# Patient Record
Sex: MALE | Race: WHITE | Employment: FULL TIME | URBAN - METROPOLITAN AREA
[De-identification: names, ages, dates, MRNs, and addresses within clinical notes are randomized per-mention and may not be internally consistent; named-entity substitution may affect disease eponyms.]

---

## 2017-02-21 PROBLEM — S83.232A COMPLEX TEAR OF MEDIAL MENISCUS OF LEFT KNEE AS CURRENT INJURY: Status: ACTIVE | Noted: 2017-02-21

## 2017-02-21 PROBLEM — M17.12 PRIMARY OSTEOARTHRITIS OF LEFT KNEE: Status: ACTIVE | Noted: 2017-02-21

## 2017-02-21 PROBLEM — S83.232A COMPLEX TEAR OF MEDIAL MENISCUS OF LEFT KNEE: Status: ACTIVE | Noted: 2017-02-21

## 2017-03-05 ENCOUNTER — LAB ENCOUNTER (OUTPATIENT)
Dept: LAB | Facility: HOSPITAL | Age: 57
End: 2017-03-05
Attending: ORTHOPAEDIC SURGERY
Payer: COMMERCIAL

## 2017-03-05 DIAGNOSIS — Z01.818 PRE-OP TESTING: ICD-10-CM

## 2017-03-05 LAB
ALBUMIN SERPL BCP-MCNC: 3.5 G/DL (ref 3.5–4.8)
ALBUMIN/GLOB SERPL: 1.3 {RATIO} (ref 1–2)
ALP SERPL-CCNC: 49 U/L (ref 32–100)
ALT SERPL-CCNC: 18 U/L (ref 17–63)
ANION GAP SERPL CALC-SCNC: 7 MMOL/L (ref 0–18)
AST SERPL-CCNC: 19 U/L (ref 15–41)
BASOPHILS # BLD: 0 K/UL (ref 0–0.2)
BASOPHILS NFR BLD: 1 %
BILIRUB SERPL-MCNC: 0.6 MG/DL (ref 0.3–1.2)
BUN SERPL-MCNC: 13 MG/DL (ref 8–20)
BUN/CREAT SERPL: 12.4 (ref 10–20)
CALCIUM SERPL-MCNC: 9.1 MG/DL (ref 8.5–10.5)
CHLORIDE SERPL-SCNC: 108 MMOL/L (ref 95–110)
CO2 SERPL-SCNC: 26 MMOL/L (ref 22–32)
CREAT SERPL-MCNC: 1.05 MG/DL (ref 0.5–1.5)
EOSINOPHIL # BLD: 0.3 K/UL (ref 0–0.7)
EOSINOPHIL NFR BLD: 5 %
ERYTHROCYTE [DISTWIDTH] IN BLOOD BY AUTOMATED COUNT: 13.9 % (ref 11–15)
GLOBULIN PLAS-MCNC: 2.8 G/DL (ref 2.5–3.7)
GLUCOSE SERPL-MCNC: 107 MG/DL (ref 70–99)
HCT VFR BLD AUTO: 43.2 % (ref 41–52)
HGB BLD-MCNC: 14.2 G/DL (ref 13.5–17.5)
LYMPHOCYTES # BLD: 1.5 K/UL (ref 1–4)
LYMPHOCYTES NFR BLD: 30 %
MCH RBC QN AUTO: 27.2 PG (ref 27–32)
MCHC RBC AUTO-ENTMCNC: 33 G/DL (ref 32–37)
MCV RBC AUTO: 82.4 FL (ref 80–100)
MONOCYTES # BLD: 0.5 K/UL (ref 0–1)
MONOCYTES NFR BLD: 10 %
NEUTROPHILS # BLD AUTO: 2.8 K/UL (ref 1.8–7.7)
NEUTROPHILS NFR BLD: 55 %
OSMOLALITY UR CALC.SUM OF ELEC: 293 MOSM/KG (ref 275–295)
PLATELET # BLD AUTO: 202 K/UL (ref 140–400)
PMV BLD AUTO: 8.1 FL (ref 7.4–10.3)
POTASSIUM SERPL-SCNC: 4.1 MMOL/L (ref 3.3–5.1)
PROT SERPL-MCNC: 6.3 G/DL (ref 5.9–8.4)
RBC # BLD AUTO: 5.24 M/UL (ref 4.5–5.9)
SODIUM SERPL-SCNC: 141 MMOL/L (ref 136–144)
WBC # BLD AUTO: 5.1 K/UL (ref 4–11)

## 2017-03-05 PROCEDURE — 36415 COLL VENOUS BLD VENIPUNCTURE: CPT

## 2017-03-05 PROCEDURE — 81003 URINALYSIS AUTO W/O SCOPE: CPT | Performed by: ORTHOPAEDIC SURGERY

## 2017-03-05 PROCEDURE — 85025 COMPLETE CBC W/AUTO DIFF WBC: CPT

## 2017-03-05 PROCEDURE — 80053 COMPREHEN METABOLIC PANEL: CPT

## 2017-03-06 RX ORDER — MV-MINS/FOLIC/LYCOPENE/GINKGO 400-300MCG
1 TABLET ORAL
COMMUNITY

## 2017-03-10 ENCOUNTER — ANESTHESIA EVENT (OUTPATIENT)
Dept: SURGERY | Facility: HOSPITAL | Age: 57
End: 2017-03-10
Payer: COMMERCIAL

## 2017-03-10 ENCOUNTER — SURGERY (OUTPATIENT)
Age: 57
End: 2017-03-10

## 2017-03-10 ENCOUNTER — ANESTHESIA (OUTPATIENT)
Dept: SURGERY | Facility: HOSPITAL | Age: 57
End: 2017-03-10
Payer: COMMERCIAL

## 2017-03-10 ENCOUNTER — HOSPITAL ENCOUNTER (OUTPATIENT)
Facility: HOSPITAL | Age: 57
Setting detail: HOSPITAL OUTPATIENT SURGERY
Discharge: HOME | End: 2017-03-10
Attending: ORTHOPAEDIC SURGERY | Admitting: ORTHOPAEDIC SURGERY
Payer: COMMERCIAL

## 2017-03-10 VITALS
DIASTOLIC BLOOD PRESSURE: 88 MMHG | HEART RATE: 54 BPM | TEMPERATURE: 97 F | RESPIRATION RATE: 16 BRPM | HEIGHT: 66 IN | OXYGEN SATURATION: 97 % | SYSTOLIC BLOOD PRESSURE: 138 MMHG | BODY MASS INDEX: 30.53 KG/M2 | WEIGHT: 190 LBS

## 2017-03-10 PROCEDURE — 94010 BREATHING CAPACITY TEST: CPT | Performed by: ORTHOPAEDIC SURGERY

## 2017-03-10 PROCEDURE — 0SBD4ZZ EXCISION OF LEFT KNEE JOINT, PERCUTANEOUS ENDOSCOPIC APPROACH: ICD-10-PCS | Performed by: ORTHOPAEDIC SURGERY

## 2017-03-10 PROCEDURE — 88304 TISSUE EXAM BY PATHOLOGIST: CPT | Performed by: ORTHOPAEDIC SURGERY

## 2017-03-10 RX ORDER — HYDROMORPHONE HYDROCHLORIDE 1 MG/ML
0.4 INJECTION, SOLUTION INTRAMUSCULAR; INTRAVENOUS; SUBCUTANEOUS EVERY 5 MIN PRN
Status: DISCONTINUED | OUTPATIENT
Start: 2017-03-10 | End: 2017-03-10

## 2017-03-10 RX ORDER — ONDANSETRON 2 MG/ML
4 INJECTION INTRAMUSCULAR; INTRAVENOUS EVERY 6 HOURS PRN
Status: DISCONTINUED | OUTPATIENT
Start: 2017-03-10 | End: 2017-03-10

## 2017-03-10 RX ORDER — ONDANSETRON 2 MG/ML
INJECTION INTRAMUSCULAR; INTRAVENOUS AS NEEDED
Status: DISCONTINUED | OUTPATIENT
Start: 2017-03-10 | End: 2017-03-10 | Stop reason: SURG

## 2017-03-10 RX ORDER — MORPHINE SULFATE 10 MG/ML
6 INJECTION, SOLUTION INTRAMUSCULAR; INTRAVENOUS EVERY 10 MIN PRN
Status: DISCONTINUED | OUTPATIENT
Start: 2017-03-10 | End: 2017-03-10

## 2017-03-10 RX ORDER — ACETAMINOPHEN 325 MG/1
650 TABLET ORAL ONCE
Status: COMPLETED | OUTPATIENT
Start: 2017-03-10 | End: 2017-03-10

## 2017-03-10 RX ORDER — HYDROMORPHONE HYDROCHLORIDE 1 MG/ML
0.6 INJECTION, SOLUTION INTRAMUSCULAR; INTRAVENOUS; SUBCUTANEOUS EVERY 5 MIN PRN
Status: DISCONTINUED | OUTPATIENT
Start: 2017-03-10 | End: 2017-03-10

## 2017-03-10 RX ORDER — HYDROCODONE BITARTRATE AND ACETAMINOPHEN 5; 325 MG/1; MG/1
1 TABLET ORAL AS NEEDED
Status: DISCONTINUED | OUTPATIENT
Start: 2017-03-10 | End: 2017-03-10

## 2017-03-10 RX ORDER — NALOXONE HYDROCHLORIDE 0.4 MG/ML
80 INJECTION, SOLUTION INTRAMUSCULAR; INTRAVENOUS; SUBCUTANEOUS AS NEEDED
Status: DISCONTINUED | OUTPATIENT
Start: 2017-03-10 | End: 2017-03-10

## 2017-03-10 RX ORDER — HYDROCODONE BITARTRATE AND ACETAMINOPHEN 5; 325 MG/1; MG/1
1 TABLET ORAL EVERY 4 HOURS PRN
Status: DISCONTINUED | OUTPATIENT
Start: 2017-03-10 | End: 2017-03-10

## 2017-03-10 RX ORDER — SODIUM CHLORIDE, SODIUM LACTATE, POTASSIUM CHLORIDE, CALCIUM CHLORIDE 600; 310; 30; 20 MG/100ML; MG/100ML; MG/100ML; MG/100ML
INJECTION, SOLUTION INTRAVENOUS CONTINUOUS
Status: DISCONTINUED | OUTPATIENT
Start: 2017-03-10 | End: 2017-03-10

## 2017-03-10 RX ORDER — ONDANSETRON 2 MG/ML
4 INJECTION INTRAMUSCULAR; INTRAVENOUS ONCE AS NEEDED
Status: DISCONTINUED | OUTPATIENT
Start: 2017-03-10 | End: 2017-03-10

## 2017-03-10 RX ORDER — DEXAMETHASONE SODIUM PHOSPHATE 4 MG/ML
VIAL (ML) INJECTION AS NEEDED
Status: DISCONTINUED | OUTPATIENT
Start: 2017-03-10 | End: 2017-03-10 | Stop reason: SURG

## 2017-03-10 RX ORDER — MORPHINE SULFATE 4 MG/ML
4 INJECTION, SOLUTION INTRAMUSCULAR; INTRAVENOUS EVERY 10 MIN PRN
Status: DISCONTINUED | OUTPATIENT
Start: 2017-03-10 | End: 2017-03-10

## 2017-03-10 RX ORDER — HYDROCODONE BITARTRATE AND ACETAMINOPHEN 5; 325 MG/1; MG/1
2 TABLET ORAL AS NEEDED
Status: DISCONTINUED | OUTPATIENT
Start: 2017-03-10 | End: 2017-03-10

## 2017-03-10 RX ORDER — FAMOTIDINE 20 MG/1
20 TABLET ORAL ONCE
Status: COMPLETED | OUTPATIENT
Start: 2017-03-10 | End: 2017-03-10

## 2017-03-10 RX ORDER — LABETALOL HYDROCHLORIDE 5 MG/ML
INJECTION, SOLUTION INTRAVENOUS AS NEEDED
Status: DISCONTINUED | OUTPATIENT
Start: 2017-03-10 | End: 2017-03-10 | Stop reason: SURG

## 2017-03-10 RX ORDER — BUPIVACAINE HYDROCHLORIDE AND EPINEPHRINE 2.5; 5 MG/ML; UG/ML
INJECTION, SOLUTION INFILTRATION; PERINEURAL AS NEEDED
Status: DISCONTINUED | OUTPATIENT
Start: 2017-03-10 | End: 2017-03-10 | Stop reason: HOSPADM

## 2017-03-10 RX ORDER — HALOPERIDOL 5 MG/ML
0.25 INJECTION INTRAMUSCULAR ONCE AS NEEDED
Status: DISCONTINUED | OUTPATIENT
Start: 2017-03-10 | End: 2017-03-10

## 2017-03-10 RX ORDER — ACETAMINOPHEN 325 MG/1
650 TABLET ORAL EVERY 4 HOURS PRN
Status: DISCONTINUED | OUTPATIENT
Start: 2017-03-10 | End: 2017-03-10

## 2017-03-10 RX ORDER — HYDROMORPHONE HYDROCHLORIDE 1 MG/ML
0.2 INJECTION, SOLUTION INTRAMUSCULAR; INTRAVENOUS; SUBCUTANEOUS EVERY 5 MIN PRN
Status: DISCONTINUED | OUTPATIENT
Start: 2017-03-10 | End: 2017-03-10

## 2017-03-10 RX ORDER — LIDOCAINE HYDROCHLORIDE 10 MG/ML
INJECTION, SOLUTION EPIDURAL; INFILTRATION; INTRACAUDAL; PERINEURAL AS NEEDED
Status: DISCONTINUED | OUTPATIENT
Start: 2017-03-10 | End: 2017-03-10 | Stop reason: SURG

## 2017-03-10 RX ORDER — METOCLOPRAMIDE 10 MG/1
10 TABLET ORAL ONCE
Status: COMPLETED | OUTPATIENT
Start: 2017-03-10 | End: 2017-03-10

## 2017-03-10 RX ORDER — HYDROCODONE BITARTRATE AND ACETAMINOPHEN 5; 325 MG/1; MG/1
2 TABLET ORAL EVERY 4 HOURS PRN
Status: DISCONTINUED | OUTPATIENT
Start: 2017-03-10 | End: 2017-03-10

## 2017-03-10 RX ORDER — MIDAZOLAM HYDROCHLORIDE 1 MG/ML
INJECTION INTRAMUSCULAR; INTRAVENOUS AS NEEDED
Status: DISCONTINUED | OUTPATIENT
Start: 2017-03-10 | End: 2017-03-10 | Stop reason: SURG

## 2017-03-10 RX ORDER — MORPHINE SULFATE 2 MG/ML
2 INJECTION, SOLUTION INTRAMUSCULAR; INTRAVENOUS EVERY 10 MIN PRN
Status: DISCONTINUED | OUTPATIENT
Start: 2017-03-10 | End: 2017-03-10

## 2017-03-10 RX ORDER — HYDROCODONE BITARTRATE AND ACETAMINOPHEN 7.5; 325 MG/1; MG/1
1 TABLET ORAL EVERY 4 HOURS PRN
Qty: 60 TABLET | Refills: 0 | Status: SHIPPED | OUTPATIENT
Start: 2017-03-10 | End: 2017-12-07 | Stop reason: ALTCHOICE

## 2017-03-10 RX ADMIN — LABETALOL HYDROCHLORIDE 10 MG: 5 INJECTION, SOLUTION INTRAVENOUS at 08:47:00

## 2017-03-10 RX ADMIN — LIDOCAINE HYDROCHLORIDE 50 MG: 10 INJECTION, SOLUTION EPIDURAL; INFILTRATION; INTRACAUDAL; PERINEURAL at 07:54:00

## 2017-03-10 RX ADMIN — ONDANSETRON 4 MG: 2 INJECTION INTRAMUSCULAR; INTRAVENOUS at 08:09:00

## 2017-03-10 RX ADMIN — MIDAZOLAM HYDROCHLORIDE 2 MG: 1 INJECTION INTRAMUSCULAR; INTRAVENOUS at 07:54:00

## 2017-03-10 RX ADMIN — SODIUM CHLORIDE, SODIUM LACTATE, POTASSIUM CHLORIDE, CALCIUM CHLORIDE: 600; 310; 30; 20 INJECTION, SOLUTION INTRAVENOUS at 07:56:00

## 2017-03-10 RX ADMIN — SODIUM CHLORIDE, SODIUM LACTATE, POTASSIUM CHLORIDE, CALCIUM CHLORIDE: 600; 310; 30; 20 INJECTION, SOLUTION INTRAVENOUS at 08:29:00

## 2017-03-10 RX ADMIN — DEXAMETHASONE SODIUM PHOSPHATE 4 MG: 4 MG/ML VIAL (ML) INJECTION at 08:05:00

## 2017-03-10 NOTE — INTERVAL H&P NOTE
Pre-op Diagnosis: Medial meniscus tear/internal derangement     The above referenced H&P was reviewed by Naun Negrete MD on 3/10/2017, the patient was examined and no significant changes have occurred in the patient's condition since the H&P was perfo

## 2017-03-10 NOTE — H&P
Progress Notes    Herson Dereks (MR# [id number])         Progress Notes by Anupam Jaime MD at 2/21/2017  5:12 PM      Author: Anupam Jaime MD Service: (none) Author Type: Physician     Filed: 2/21/2017  5:27 PM Note Time: 2/21/2017  5:12 PM St acute bone changes are seen.  Mild genu varum posturing is noted secondary to the medial joint space narrowing.  Details of x-rays shown and explained as usual to the patient.      An MRI OF THE LEFT KNEE was ordered by Dr. Sarina Bonilla performed at [facility] ALLERGIES:  No Known Allergies    HISTORY:  Past Medical History:  Past Medical History    Diagnosis  Date    •  Numbness and tingling          Surgical History:        Past Surgical History      COLONOSCOPY    01/01/2010        Social History:    Mari seen.      · Homans’ sign is negative.      · Distal neurovascular status is grossly intact.      · Good color and capillary refill.      · Pedal pulses palpable.      · Good range of motion of the ipsilateral hip and ankle.      The patient walks with an with chondromalacia and/or arthritis, which is present in this case. Therefore it was explained that the patient may experience persistent pain, stiffness, soreness and swelling.  If a meniscal repair is indicated, it was explained that there is a high risk

## 2017-03-10 NOTE — ANESTHESIA POSTPROCEDURE EVALUATION
Patient: Zay Xavier    Procedure Summary     Date Anesthesia Start Anesthesia Stop Room / Location    03/10/17 0746  [facility] MAIN OR 12 / 300 [facility] MAIN OR       Procedure Diagnosis Surgeon Responsible Provider    KNEE ARTHROSCOPY (Left ) (Medial meniscus tear/i

## 2017-03-10 NOTE — BRIEF OP NOTE
[facility]   Brief Op Note    Patients Name: Laron Campbell  Attending Physician: Arian Hallman MD  Operating Physician: Jhonatan Costello MD  CSN: [id number]     Location:  OR  MRN: [id number]    YOB: 1960  Admissi

## 2017-03-10 NOTE — ANESTHESIA PREPROCEDURE EVALUATION
Anesthesia PreOp Note    HPI:     Balta Glynn is a 64year old male who presents for preoperative consultation requested by: Yazmin Brandt MD    Date of Surgery: 3/10/2017    Procedure(s):  KNEE ARTHROSCOPY  Indication: Medial meniscus tear/interna Social History Main Topics   Smoking status: Former Smoker  For 20.00 Years     Quit date: 03/01/1997    Smokeless tobacco: Never Used    Alcohol Use: Yes  2.4 oz/week    4 Cans of beer per week         Drug Use: No    Sexual Activity: Not on file   No Pine  of the nature of the anesthetic plan, benefits, risks, major complications, and any alternative forms of anesthetic management. All of the patient's questions were answered to the best of my ability.  The patient desires the anesthetic management as

## 2017-03-11 NOTE — OPERATIVE REPORT
[facility]    PATIENT'S NAME: Gina Wong   ATTENDING PHYSICIAN: Jurgen Mata MD   OPERATING PHYSICIAN: Jurgen Mata MD   PATIENT ACCOUNT#:   [id number]    LOCATION:  [place] [address]  MEDICAL RECORD #:   [id number] told at the time there was no evidence of any fractures and that he may have sustained a knee muscle strain or possible meniscal tear. He was started on physical therapy and after a short period of time was returned to work.   He still is experiencing pain unpredictable in patients with chondromalacia and/or arthritis (which is present in his case).   Therefore, he was told that he may very well experience persistent pain, stiffness, soreness, swelling, and absolutely no guarantees could be given with regard changes, grade 3 and grade 4, particularly in the trochlea and the medial aspect of the patella. There was some synovitis in the suprapatellar pouch.   No intraarticular loose bodies were seen in the suprapatellar pouch, medial and lateral gutters/compartm 0.25% Marcaine was injected intraarticularly. A sterile dressing was applied. The patient seemed to tolerate the procedure well, having suffered no apparent untoward effects from the anesthetic agent or the procedure itself.   The needle and sponge counts

## 2017-03-16 PROBLEM — S83.272A COMPLEX TEAR OF LATERAL MENISCUS OF LEFT KNEE AS CURRENT INJURY: Status: ACTIVE | Noted: 2017-03-16

## 2017-03-20 ENCOUNTER — OFFICE VISIT (OUTPATIENT)
Dept: PHYSICAL THERAPY | Age: 57
End: 2017-03-20
Attending: ORTHOPAEDIC SURGERY
Payer: COMMERCIAL

## 2017-03-20 DIAGNOSIS — M94.262 CHONDROMALACIA, KNEE, LEFT: ICD-10-CM

## 2017-03-20 DIAGNOSIS — M23.92 INTERNAL DERANGEMENT OF KNEE, ACUTE, LEFT: Primary | ICD-10-CM

## 2017-03-20 DIAGNOSIS — S83.272D COMPLEX TEAR OF LATERAL MENISCUS OF LEFT KNEE AS CURRENT INJURY, SUBSEQUENT ENCOUNTER: ICD-10-CM

## 2017-03-20 DIAGNOSIS — S83.222D PERIPHERAL TEAR OF MEDIAL MENISCUS OF LEFT KNEE AS CURRENT INJURY, SUBSEQUENT ENCOUNTER: ICD-10-CM

## 2017-03-20 DIAGNOSIS — M17.12 PRIMARY OSTEOARTHRITIS OF LEFT KNEE: ICD-10-CM

## 2017-03-20 PROCEDURE — 97161 PT EVAL LOW COMPLEX 20 MIN: CPT

## 2017-03-20 PROCEDURE — 97110 THERAPEUTIC EXERCISES: CPT

## 2017-03-20 NOTE — PROGRESS NOTES
PHYSICAL THERAPY EVALUATION:   Referring Physician: Dr. Bekah Post  Diagnosis: Internal derangement of knee, acute, left (M23.92)  Peripheral tear of medial meniscus of left knee as current injury, subsequent encounter (P49.302S)  Complex tear of lateral me pushing/pulling  Equipment Currently Using: none    Past medical history was reviewed with Graeme Leo.  Significant findings include L arthroscopy    Patient/Family Goal: Regain full confidence of using L leg        ASSESSMENT:   Graeme Leo presented to therapy w management of symptoms  2. Pt will improve L  Knee AROM with 0 extension and 130 flexion for safe return to prior level of functions.    3. Pt will improve strength to 5/5 on all mm graded below 5/5 for improved stability when he returns to prior level of f

## 2017-03-22 ENCOUNTER — OFFICE VISIT (OUTPATIENT)
Dept: PHYSICAL THERAPY | Age: 57
End: 2017-03-22
Attending: ORTHOPAEDIC SURGERY
Payer: COMMERCIAL

## 2017-03-22 PROCEDURE — 97110 THERAPEUTIC EXERCISES: CPT

## 2017-03-22 NOTE — PROGRESS NOTES
Dx: Internal derangement of knee, acute, left (M23.92)  Peripheral tear of medial meniscus of left knee as current injury, subsequent encounter (L14.628K)  Complex tear of lateral meniscus of left knee as current injury, subsequent encounter (S33.271X)  Ch cont per POC    Charges:  Theraex x3       Total Timed Treatment: 45 min  Total Treatment Time: 45 min

## 2017-03-23 PROBLEM — S83.222A PERIPHERAL TEAR OF MEDIAL MENISCUS OF LEFT KNEE AS CURRENT INJURY: Status: ACTIVE | Noted: 2017-03-23

## 2017-03-24 ENCOUNTER — OFFICE VISIT (OUTPATIENT)
Dept: PHYSICAL THERAPY | Age: 57
End: 2017-03-24
Attending: ORTHOPAEDIC SURGERY
Payer: COMMERCIAL

## 2017-03-24 PROCEDURE — 97110 THERAPEUTIC EXERCISES: CPT

## 2017-03-24 NOTE — PROGRESS NOTES
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  Pt will be able to perform gentle plyometrics/agility ex in clinic with no pain increase to indicate readiness to upgrade a

## 2017-03-28 ENCOUNTER — OFFICE VISIT (OUTPATIENT)
Dept: PHYSICAL THERAPY | Age: 57
End: 2017-03-28
Attending: ORTHOPAEDIC SURGERY
Payer: COMMERCIAL

## 2017-03-28 PROCEDURE — 97110 THERAPEUTIC EXERCISES: CPT

## 2017-03-28 NOTE — PROGRESS NOTES
Dx: Internal derangement of knee, acute, left (M23.92)  Peripheral tear of medial meniscus of left knee as current injury, subsequent encounter (A35.008J)  Complex tear of lateral meniscus of left knee as current injury, subsequent encounter (V17.666C)  Ch bosu lunges      Seated ham curls 2x10 GTB Lateral bosu lunges 2x10      SLS on foam cone taps 2x10 SLS on 2# 2x10 x 3 directions               Assessment: 0-130 knee  flexion this day  Goals     • Therapy Goals      Goals:    1.  Pt will be I with HEP,its

## 2017-03-30 ENCOUNTER — APPOINTMENT (OUTPATIENT)
Dept: PHYSICAL THERAPY | Age: 57
End: 2017-03-30
Attending: ORTHOPAEDIC SURGERY
Payer: COMMERCIAL

## 2017-04-03 ENCOUNTER — OFFICE VISIT (OUTPATIENT)
Dept: PHYSICAL THERAPY | Age: 57
End: 2017-04-03
Attending: INTERNAL MEDICINE
Payer: COMMERCIAL

## 2017-04-03 PROCEDURE — 97110 THERAPEUTIC EXERCISES: CPT

## 2017-04-03 NOTE — PROGRESS NOTES
Dx: Internal derangement of knee, acute, left (M23.92)  Peripheral tear of medial meniscus of left knee as current injury, subsequent encounter (R61.629I)  Complex tear of lateral meniscus of left knee as current injury, subsequent encounter (Q78.632F)  Ch R/L LE 4 bands 2x10 SLS on 2# 2x10 x 3 directions     Shuttle B heel raises 4 bands Shuttle B heel raises 4 bands      Standing TKE's 10 secs x10 GTB Alternate bosu lunges      Seated ham curls 2x10 GTB Lateral bosu lunges 2x10      SLS on foam cone taps 2

## 2017-04-05 ENCOUNTER — APPOINTMENT (OUTPATIENT)
Dept: PHYSICAL THERAPY | Age: 57
End: 2017-04-05
Payer: COMMERCIAL

## 2017-04-10 ENCOUNTER — OFFICE VISIT (OUTPATIENT)
Dept: PHYSICAL THERAPY | Age: 57
End: 2017-04-10
Attending: ORTHOPAEDIC SURGERY
Payer: COMMERCIAL

## 2017-04-10 PROCEDURE — 97110 THERAPEUTIC EXERCISES: CPT | Performed by: PHYSICAL THERAPIST

## 2017-04-10 NOTE — PROGRESS NOTES
Dx: Internal derangement of knee, acute, left (M23.92)  Peripheral tear of medial meniscus of left knee as current injury, subsequent encounter (L81.524W)  Complex tear of lateral meniscus of left knee as current injury, subsequent encounter (T96.170F)  Ch able to perform gentle plyometrics/agility ex in clinic with no pain increase to indicate readiness to upgrade activity once cleared by MD                  Plan:Continue, advance strengthening as able. Charges:  Theraex x3       Total Timed Treatment: 4

## 2017-04-11 ENCOUNTER — APPOINTMENT (OUTPATIENT)
Dept: PHYSICAL THERAPY | Age: 57
End: 2017-04-11
Payer: COMMERCIAL

## 2017-04-12 ENCOUNTER — APPOINTMENT (OUTPATIENT)
Dept: PHYSICAL THERAPY | Age: 57
End: 2017-04-12
Payer: COMMERCIAL

## 2017-04-13 ENCOUNTER — APPOINTMENT (OUTPATIENT)
Dept: PHYSICAL THERAPY | Age: 57
End: 2017-04-13
Attending: ORTHOPAEDIC SURGERY
Payer: COMMERCIAL

## 2017-04-14 ENCOUNTER — APPOINTMENT (OUTPATIENT)
Dept: PHYSICAL THERAPY | Age: 57
End: 2017-04-14
Attending: ORTHOPAEDIC SURGERY
Payer: COMMERCIAL

## 2017-04-17 ENCOUNTER — OFFICE VISIT (OUTPATIENT)
Dept: PHYSICAL THERAPY | Age: 57
End: 2017-04-17
Attending: ORTHOPAEDIC SURGERY
Payer: COMMERCIAL

## 2017-04-17 PROCEDURE — 97110 THERAPEUTIC EXERCISES: CPT | Performed by: PHYSICAL THERAPIST

## 2017-04-17 NOTE — PROGRESS NOTES
Dx: Internal derangement of knee, acute, left (M23.92)  Peripheral tear of medial meniscus of left knee as current injury, subsequent encounter (Q98.341E)  Complex tear of lateral meniscus of left knee as current injury, subsequent encounter (K84.743K)  Ch plyometrics/agility ex in clinic with no pain increase to indicate readiness to upgrade activity once cleared by MD                  Plan:Await outcome of MD visit regarding any futher therapy treatments needed. Charges:  [employer] x3       Total Timed Tr

## 2017-04-18 ENCOUNTER — APPOINTMENT (OUTPATIENT)
Dept: PHYSICAL THERAPY | Age: 57
End: 2017-04-18
Attending: ORTHOPAEDIC SURGERY
Payer: COMMERCIAL

## 2017-04-19 ENCOUNTER — APPOINTMENT (OUTPATIENT)
Dept: PHYSICAL THERAPY | Age: 57
End: 2017-04-19
Attending: ORTHOPAEDIC SURGERY
Payer: COMMERCIAL

## 2017-04-20 ENCOUNTER — APPOINTMENT (OUTPATIENT)
Dept: PHYSICAL THERAPY | Age: 57
End: 2017-04-20
Attending: ORTHOPAEDIC SURGERY
Payer: COMMERCIAL

## 2017-04-21 ENCOUNTER — APPOINTMENT (OUTPATIENT)
Dept: PHYSICAL THERAPY | Age: 57
End: 2017-04-21
Attending: ORTHOPAEDIC SURGERY
Payer: COMMERCIAL

## 2017-04-24 ENCOUNTER — APPOINTMENT (OUTPATIENT)
Dept: PHYSICAL THERAPY | Age: 57
End: 2017-04-24
Attending: ORTHOPAEDIC SURGERY
Payer: COMMERCIAL

## 2017-04-25 ENCOUNTER — APPOINTMENT (OUTPATIENT)
Dept: PHYSICAL THERAPY | Age: 57
End: 2017-04-25
Attending: ORTHOPAEDIC SURGERY
Payer: COMMERCIAL

## 2017-04-26 ENCOUNTER — APPOINTMENT (OUTPATIENT)
Dept: PHYSICAL THERAPY | Age: 57
End: 2017-04-26
Attending: ORTHOPAEDIC SURGERY
Payer: COMMERCIAL

## 2017-04-27 ENCOUNTER — APPOINTMENT (OUTPATIENT)
Dept: PHYSICAL THERAPY | Age: 57
End: 2017-04-27
Attending: ORTHOPAEDIC SURGERY
Payer: COMMERCIAL

## 2017-04-28 ENCOUNTER — APPOINTMENT (OUTPATIENT)
Dept: PHYSICAL THERAPY | Age: 57
End: 2017-04-28
Attending: ORTHOPAEDIC SURGERY
Payer: COMMERCIAL

## 2017-05-01 ENCOUNTER — APPOINTMENT (OUTPATIENT)
Dept: PHYSICAL THERAPY | Age: 57
End: 2017-05-01
Attending: ORTHOPAEDIC SURGERY
Payer: COMMERCIAL

## 2017-05-03 ENCOUNTER — APPOINTMENT (OUTPATIENT)
Dept: PHYSICAL THERAPY | Age: 57
End: 2017-05-03
Attending: ORTHOPAEDIC SURGERY
Payer: COMMERCIAL

## 2017-05-04 ENCOUNTER — APPOINTMENT (OUTPATIENT)
Dept: PHYSICAL THERAPY | Age: 57
End: 2017-05-04
Attending: ORTHOPAEDIC SURGERY
Payer: COMMERCIAL

## 2017-12-11 ENCOUNTER — HOSPITAL ENCOUNTER (OUTPATIENT)
Dept: GENERAL RADIOLOGY | Age: 57
Discharge: HOME | End: 2017-12-11
Attending: INTERNAL MEDICINE
Payer: COMMERCIAL

## 2017-12-11 DIAGNOSIS — M54.2 NECK PAIN: ICD-10-CM

## 2017-12-11 DIAGNOSIS — M25.511 RIGHT SHOULDER PAIN, UNSPECIFIED CHRONICITY: ICD-10-CM

## 2017-12-11 PROCEDURE — 72040 X-RAY EXAM NECK SPINE 2-3 VW: CPT | Performed by: INTERNAL MEDICINE

## 2017-12-11 PROCEDURE — 73030 X-RAY EXAM OF SHOULDER: CPT | Performed by: INTERNAL MEDICINE

## 2018-04-28 ENCOUNTER — APPOINTMENT (OUTPATIENT)
Dept: LAB | Age: 58
End: 2018-04-28
Attending: INTERNAL MEDICINE
Payer: COMMERCIAL

## 2018-04-28 PROCEDURE — 80061 LIPID PANEL: CPT | Performed by: INTERNAL MEDICINE

## 2018-04-28 PROCEDURE — 36415 COLL VENOUS BLD VENIPUNCTURE: CPT | Performed by: INTERNAL MEDICINE

## 2018-04-28 PROCEDURE — 80053 COMPREHEN METABOLIC PANEL: CPT | Performed by: INTERNAL MEDICINE

## 2018-04-28 PROCEDURE — 84153 ASSAY OF PSA TOTAL: CPT | Performed by: INTERNAL MEDICINE

## 2018-04-28 PROCEDURE — 84154 ASSAY OF PSA FREE: CPT | Performed by: INTERNAL MEDICINE

## 2018-04-28 PROCEDURE — 82627 DEHYDROEPIANDROSTERONE: CPT | Performed by: INTERNAL MEDICINE

## 2018-04-28 PROCEDURE — 80050 GENERAL HEALTH PANEL: CPT | Performed by: INTERNAL MEDICINE

## 2018-04-28 PROCEDURE — 85025 COMPLETE CBC W/AUTO DIFF WBC: CPT | Performed by: INTERNAL MEDICINE

## 2018-04-28 PROCEDURE — 82306 VITAMIN D 25 HYDROXY: CPT | Performed by: INTERNAL MEDICINE

## 2018-04-28 PROCEDURE — 84439 ASSAY OF FREE THYROXINE: CPT | Performed by: INTERNAL MEDICINE

## 2018-04-28 PROCEDURE — 84403 ASSAY OF TOTAL TESTOSTERONE: CPT | Performed by: INTERNAL MEDICINE

## 2018-08-02 ENCOUNTER — APPOINTMENT (OUTPATIENT)
Dept: LAB | Age: 58
End: 2018-08-02
Attending: INTERNAL MEDICINE
Payer: COMMERCIAL

## 2018-08-02 DIAGNOSIS — E29.1 HYPOGONADISM IN MALE: ICD-10-CM

## 2018-08-02 DIAGNOSIS — R53.83 FATIGUE, UNSPECIFIED TYPE: ICD-10-CM

## 2018-08-02 LAB
ERYTHROCYTE [DISTWIDTH] IN BLOOD BY AUTOMATED COUNT: 13.5 % (ref 11–15)
HCT VFR BLD AUTO: 49.9 % (ref 41–52)
HGB BLD-MCNC: 16.7 G/DL (ref 13.5–17.5)
MCH RBC QN AUTO: 27.5 PG (ref 27–32)
MCHC RBC AUTO-ENTMCNC: 33.4 G/DL (ref 32–37)
MCV RBC AUTO: 82.3 FL (ref 80–100)
PLATELET # BLD AUTO: 249 K/UL (ref 140–400)
PMV BLD AUTO: 8.6 FL (ref 7.4–10.3)
PSA SERPL-MCNC: 3.8 NG/ML (ref 0–4)
RBC # BLD AUTO: 6.05 M/UL (ref 4.5–5.9)
T4 SERPL-MCNC: 9.1 MCG/DL (ref 6.1–12.2)
TSH SERPL-ACNC: 2.01 UIU/ML (ref 0.45–5.33)
WBC # BLD AUTO: 5.4 K/UL (ref 4–11)

## 2018-08-02 PROCEDURE — 86800 THYROGLOBULIN ANTIBODY: CPT

## 2018-08-02 PROCEDURE — 85027 COMPLETE CBC AUTOMATED: CPT

## 2018-08-02 PROCEDURE — 84403 ASSAY OF TOTAL TESTOSTERONE: CPT

## 2018-08-02 PROCEDURE — 84153 ASSAY OF PSA TOTAL: CPT

## 2018-08-02 PROCEDURE — 84443 ASSAY THYROID STIM HORMONE: CPT

## 2018-08-02 PROCEDURE — 84436 ASSAY OF TOTAL THYROXINE: CPT

## 2018-08-02 PROCEDURE — 36415 COLL VENOUS BLD VENIPUNCTURE: CPT

## 2018-08-02 PROCEDURE — 84402 ASSAY OF FREE TESTOSTERONE: CPT

## 2018-08-03 LAB — THYROGLOBULIN AB: <0.9 IU/ML

## 2018-08-04 LAB
TESTOSTERONE, FREE, S: 34.8 NG/DL
TESTOSTERONE, TOTAL, S: 915 NG/DL

## 2018-10-31 ENCOUNTER — APPOINTMENT (OUTPATIENT)
Dept: LAB | Age: 58
End: 2018-10-31
Attending: INTERNAL MEDICINE
Payer: COMMERCIAL

## 2018-10-31 DIAGNOSIS — R97.20 ELEVATED PSA: ICD-10-CM

## 2018-10-31 PROCEDURE — 84153 ASSAY OF PSA TOTAL: CPT

## 2018-10-31 PROCEDURE — 36415 COLL VENOUS BLD VENIPUNCTURE: CPT

## 2018-10-31 PROCEDURE — 84154 ASSAY OF PSA FREE: CPT

## 2019-01-09 ENCOUNTER — OFFICE VISIT (OUTPATIENT)
Dept: SURGERY | Facility: CLINIC | Age: 59
End: 2019-01-09

## 2019-01-09 VITALS
HEIGHT: 66 IN | DIASTOLIC BLOOD PRESSURE: 75 MMHG | WEIGHT: 180 LBS | HEART RATE: 65 BPM | SYSTOLIC BLOOD PRESSURE: 122 MMHG | TEMPERATURE: 98 F | BODY MASS INDEX: 28.93 KG/M2

## 2019-01-09 DIAGNOSIS — R35.1 NOCTURIA: ICD-10-CM

## 2019-01-09 DIAGNOSIS — Z87.891 FORMER SMOKER: ICD-10-CM

## 2019-01-09 DIAGNOSIS — R97.20 ELEVATED PSA: Primary | ICD-10-CM

## 2019-01-09 PROCEDURE — 99212 OFFICE O/P EST SF 10 MIN: CPT | Performed by: UROLOGY

## 2019-01-09 PROCEDURE — 99244 OFF/OP CNSLTJ NEW/EST MOD 40: CPT | Performed by: UROLOGY

## 2019-01-09 NOTE — PATIENT INSTRUCTIONS
Ánegla Ibarra M.D.    1.  Blood draw for PSA--total and free during the next several days; no sexual stimulation or excitation for 5 days before the actual PSA blood draw.     2.  When you go in for your blood draw PSA a few causes a very sudden large output of urine, and can contribute to waking up at night to urinate.      7. If you take a lot of medications at bedtime, discuss with your primary physician whether any of these medications can be taken in the morning, so there

## 2019-01-09 NOTE — PROGRESS NOTES
Tricia Doctor is a 62year old male. HPI:   Patient presents with:  elevated psa: PT referred by PCP for Elevated PSA. History provided by pt.     Elevated PSA -- denies associated symptoms to suggest prostate cancer such as weight loss or los history.      Family History--He states father had a cancer of the kidney which was excised and there was no recurrence        HISTORY:  Past Medical History:   Diagnosis Date   • Numbness and tingling    • Osteoarthritis       Past Surgical History:   Proc hearing loss and nasal drainage  Eyes:  Negative for eye discharge and vision loss  Hema/Lymph:  Negative for easy bleeding and easy bruising  Integumentary:  Negative for pruritus and rash  Musculoskeletal:  Negative for bone/joint symptoms  Psychiatric: Microscopic = not indicated. 11/26/2016 PSA = 2.4    UROLOGICAL IMAGING   No results found. ASSESSMENT/PLAN:   (R97.20) Elevated PSA  (primary encounter diagnosis)  On 10/31/2018  PSA was elevated at 4.3 compared to 08/02/2018  when PSA was 3.8.  PSA days before the actual PSA blood draw.     2. When you go in for your blood draw PSA a few days from now, ALSO urinalysis urine test      3. We will notify you of the results of the above tests. . If the PSA does not show improvement, we will consider opt

## 2019-01-13 ENCOUNTER — LAB ENCOUNTER (OUTPATIENT)
Dept: LAB | Facility: HOSPITAL | Age: 59
End: 2019-01-13
Attending: UROLOGY
Payer: COMMERCIAL

## 2019-01-13 DIAGNOSIS — R35.1 NOCTURIA: ICD-10-CM

## 2019-01-13 DIAGNOSIS — R97.20 ELEVATED PSA: ICD-10-CM

## 2019-01-13 LAB
BILIRUB UR QL: NEGATIVE
CLARITY UR: CLEAR
COLOR UR: YELLOW
GLUCOSE UR-MCNC: NEGATIVE MG/DL
HGB UR QL STRIP.AUTO: NEGATIVE
KETONES UR-MCNC: NEGATIVE MG/DL
LEUKOCYTE ESTERASE UR QL STRIP.AUTO: NEGATIVE
NITRITE UR QL STRIP.AUTO: NEGATIVE
PH UR: 5 [PH] (ref 5–8)
PROT UR-MCNC: NEGATIVE MG/DL
PSA FREE SERPL-MCNC: 0.48 NG/ML
PSA SERPL-MCNC: 3.9 NG/ML (ref 0–4)
PSA SERPL-MCNC: 4.61 NG/ML (ref 0.01–4)
SP GR UR STRIP: 1.02 (ref 1–1.03)
UROBILINOGEN UR STRIP-ACNC: <2
VIT C UR-MCNC: NEGATIVE MG/DL

## 2019-01-13 PROCEDURE — 84154 ASSAY OF PSA FREE: CPT

## 2019-01-13 PROCEDURE — 81003 URINALYSIS AUTO W/O SCOPE: CPT

## 2019-01-13 PROCEDURE — 36415 COLL VENOUS BLD VENIPUNCTURE: CPT

## 2019-01-13 PROCEDURE — 84153 ASSAY OF PSA TOTAL: CPT

## 2019-01-20 DIAGNOSIS — R35.1 NOCTURIA: Primary | ICD-10-CM

## 2019-01-21 ENCOUNTER — TELEPHONE (OUTPATIENT)
Dept: SURGERY | Facility: CLINIC | Age: 59
End: 2019-01-21

## 2019-01-21 NOTE — TELEPHONE ENCOUNTER
----- Message from Loli Miller MD sent at 1/20/2019  3:41 PM CST -----  Please call patient.   On 1/13/19, urinalysis urine test normal.  On that day PSA 3.9 normal.  Since PSA has come back normal, patient does not need to see me in 3 months but tika

## 2019-01-21 NOTE — TELEPHONE ENCOUNTER
Spoke to patient notified him of psa and urinalysis results and the need to f/u in 1 year rather than 3 mo patient verbalized understanding and will comply

## 2019-03-14 ENCOUNTER — APPOINTMENT (OUTPATIENT)
Dept: LAB | Age: 59
End: 2019-03-14
Attending: INTERNAL MEDICINE
Payer: COMMERCIAL

## 2019-03-14 DIAGNOSIS — E03.9 HYPOTHYROIDISM, UNSPECIFIED TYPE: ICD-10-CM

## 2019-03-14 DIAGNOSIS — R97.20 ELEVATED PSA: ICD-10-CM

## 2019-03-14 LAB
PSA FREE MFR SERPL: 19 %
PSA FREE SERPL-MCNC: 0.83 NG/ML
PSA SERPL-MCNC: 4.36 NG/ML (ref ?–4)
T3FREE SERPL-MCNC: 3.43 PG/ML (ref 2.4–4.2)
T4 FREE SERPL-MCNC: 1.1 NG/DL (ref 0.8–1.7)
TSI SER-ACNC: 1.39 MIU/ML (ref 0.36–3.74)

## 2019-03-14 PROCEDURE — 84443 ASSAY THYROID STIM HORMONE: CPT

## 2019-03-14 PROCEDURE — 84153 ASSAY OF PSA TOTAL: CPT

## 2019-03-14 PROCEDURE — 84439 ASSAY OF FREE THYROXINE: CPT

## 2019-03-14 PROCEDURE — 36415 COLL VENOUS BLD VENIPUNCTURE: CPT

## 2019-03-14 PROCEDURE — 84481 FREE ASSAY (FT-3): CPT

## 2019-03-14 PROCEDURE — 84154 ASSAY OF PSA FREE: CPT

## 2019-06-10 ENCOUNTER — LAB REQUISITION (OUTPATIENT)
Dept: LAB | Facility: HOSPITAL | Age: 59
End: 2019-06-10
Payer: COMMERCIAL

## 2019-06-10 DIAGNOSIS — Z01.89 ENCOUNTER FOR OTHER SPECIFIED SPECIAL EXAMINATIONS: ICD-10-CM

## 2019-06-10 PROCEDURE — 88305 TISSUE EXAM BY PATHOLOGIST: CPT | Performed by: SURGERY

## 2019-06-15 ENCOUNTER — APPOINTMENT (OUTPATIENT)
Dept: LAB | Age: 59
End: 2019-06-15
Attending: INTERNAL MEDICINE
Payer: COMMERCIAL

## 2019-06-15 DIAGNOSIS — R97.20 ELEVATED PSA: ICD-10-CM

## 2019-06-15 DIAGNOSIS — E03.9 HYPOTHYROIDISM, UNSPECIFIED TYPE: ICD-10-CM

## 2019-06-15 DIAGNOSIS — E29.1 HYPOGONADISM IN MALE: ICD-10-CM

## 2019-06-15 PROCEDURE — 84154 ASSAY OF PSA FREE: CPT

## 2019-06-15 PROCEDURE — 84436 ASSAY OF TOTAL THYROXINE: CPT

## 2019-06-15 PROCEDURE — 36415 COLL VENOUS BLD VENIPUNCTURE: CPT

## 2019-06-15 PROCEDURE — 84443 ASSAY THYROID STIM HORMONE: CPT

## 2019-06-15 PROCEDURE — 84481 FREE ASSAY (FT-3): CPT

## 2019-06-15 PROCEDURE — 84403 ASSAY OF TOTAL TESTOSTERONE: CPT

## 2019-06-15 PROCEDURE — 84153 ASSAY OF PSA TOTAL: CPT

## 2019-08-02 ENCOUNTER — APPOINTMENT (OUTPATIENT)
Dept: LAB | Age: 59
End: 2019-08-02
Attending: INTERNAL MEDICINE
Payer: COMMERCIAL

## 2019-08-02 DIAGNOSIS — R97.20 ELEVATED PSA: ICD-10-CM

## 2019-08-02 LAB
PSA FREE MFR SERPL: 12 %
PSA FREE SERPL-MCNC: 0.45 NG/ML
PSA SERPL-MCNC: 3.82 NG/ML (ref ?–4)

## 2019-08-02 PROCEDURE — 36415 COLL VENOUS BLD VENIPUNCTURE: CPT

## 2019-08-02 PROCEDURE — 84153 ASSAY OF PSA TOTAL: CPT

## 2019-08-02 PROCEDURE — 84154 ASSAY OF PSA FREE: CPT

## 2020-01-10 ENCOUNTER — APPOINTMENT (OUTPATIENT)
Dept: LAB | Age: 60
End: 2020-01-10
Attending: INTERNAL MEDICINE
Payer: COMMERCIAL

## 2020-01-10 DIAGNOSIS — R97.20 ELEVATED PSA: ICD-10-CM

## 2020-01-10 LAB
PSA FREE MFR SERPL: 13 %
PSA FREE SERPL-MCNC: 0.65 NG/ML
PSA SERPL-MCNC: 5.01 NG/ML (ref ?–4)

## 2020-01-10 PROCEDURE — 36415 COLL VENOUS BLD VENIPUNCTURE: CPT

## 2020-01-10 PROCEDURE — 84153 ASSAY OF PSA TOTAL: CPT

## 2020-01-10 PROCEDURE — 84154 ASSAY OF PSA FREE: CPT

## 2020-03-14 ENCOUNTER — LAB ENCOUNTER (OUTPATIENT)
Dept: LAB | Age: 60
End: 2020-03-14
Attending: INTERNAL MEDICINE
Payer: COMMERCIAL

## 2020-03-14 DIAGNOSIS — E03.9 HYPOTHYROIDISM, UNSPECIFIED TYPE: ICD-10-CM

## 2020-03-14 DIAGNOSIS — R97.20 ELEVATED PSA: ICD-10-CM

## 2020-03-14 DIAGNOSIS — Z00.00 WELL ADULT EXAM: ICD-10-CM

## 2020-03-14 DIAGNOSIS — E29.1 HYPOGONADISM IN MALE: ICD-10-CM

## 2020-03-14 LAB
ALBUMIN SERPL-MCNC: 3.7 G/DL (ref 3.4–5)
ALBUMIN/GLOB SERPL: 1.1 {RATIO} (ref 1–2)
ALP LIVER SERPL-CCNC: 78 U/L (ref 45–117)
ALT SERPL-CCNC: 30 U/L (ref 16–61)
ANION GAP SERPL CALC-SCNC: 4 MMOL/L (ref 0–18)
AST SERPL-CCNC: 24 U/L (ref 15–37)
BASOPHILS # BLD AUTO: 0.04 X10(3) UL (ref 0–0.2)
BASOPHILS NFR BLD AUTO: 0.7 %
BILIRUB SERPL-MCNC: 0.5 MG/DL (ref 0.1–2)
BUN BLD-MCNC: 20 MG/DL (ref 7–18)
BUN/CREAT SERPL: 15.7 (ref 10–20)
CALCIUM BLD-MCNC: 8.8 MG/DL (ref 8.5–10.1)
CHLORIDE SERPL-SCNC: 111 MMOL/L (ref 98–112)
CHOLEST SMN-MCNC: 196 MG/DL (ref ?–200)
CO2 SERPL-SCNC: 25 MMOL/L (ref 21–32)
CREAT BLD-MCNC: 1.27 MG/DL (ref 0.7–1.3)
DEPRECATED RDW RBC AUTO: 39.5 FL (ref 35.1–46.3)
EOSINOPHIL # BLD AUTO: 0.15 X10(3) UL (ref 0–0.7)
EOSINOPHIL NFR BLD AUTO: 2.6 %
ERYTHROCYTE [DISTWIDTH] IN BLOOD BY AUTOMATED COUNT: 12.9 % (ref 11–15)
GLOBULIN PLAS-MCNC: 3.5 G/DL (ref 2.8–4.4)
GLUCOSE BLD-MCNC: 96 MG/DL (ref 70–99)
HCT VFR BLD AUTO: 48 % (ref 39–53)
HDLC SERPL-MCNC: 50 MG/DL (ref 40–59)
HGB BLD-MCNC: 15.7 G/DL (ref 13–17.5)
IMM GRANULOCYTES # BLD AUTO: 0.01 X10(3) UL (ref 0–1)
IMM GRANULOCYTES NFR BLD: 0.2 %
LDLC SERPL CALC-MCNC: 133 MG/DL (ref ?–100)
LYMPHOCYTES # BLD AUTO: 1.46 X10(3) UL (ref 1–4)
LYMPHOCYTES NFR BLD AUTO: 25.1 %
M PROTEIN MFR SERPL ELPH: 7.2 G/DL (ref 6.4–8.2)
MCH RBC QN AUTO: 27.4 PG (ref 26–34)
MCHC RBC AUTO-ENTMCNC: 32.7 G/DL (ref 31–37)
MCV RBC AUTO: 83.9 FL (ref 80–100)
MONOCYTES # BLD AUTO: 0.46 X10(3) UL (ref 0.1–1)
MONOCYTES NFR BLD AUTO: 7.9 %
NEUTROPHILS # BLD AUTO: 3.69 X10 (3) UL (ref 1.5–7.7)
NEUTROPHILS # BLD AUTO: 3.69 X10(3) UL (ref 1.5–7.7)
NEUTROPHILS NFR BLD AUTO: 63.5 %
NONHDLC SERPL-MCNC: 146 MG/DL (ref ?–130)
OSMOLALITY SERPL CALC.SUM OF ELEC: 292 MOSM/KG (ref 275–295)
PATIENT FASTING Y/N/NP: YES
PATIENT FASTING Y/N/NP: YES
PLATELET # BLD AUTO: 191 10(3)UL (ref 150–450)
POTASSIUM SERPL-SCNC: 4.4 MMOL/L (ref 3.5–5.1)
PSA FREE MFR SERPL: 13 %
PSA FREE SERPL-MCNC: 0.58 NG/ML
PSA SERPL-MCNC: 4.34 NG/ML (ref ?–4)
RBC # BLD AUTO: 5.72 X10(6)UL (ref 4.3–5.7)
SODIUM SERPL-SCNC: 140 MMOL/L (ref 136–145)
T3FREE SERPL-MCNC: 2.59 PG/ML (ref 2.4–4.2)
T4 FREE SERPL-MCNC: 1.3 NG/DL (ref 0.8–1.7)
TESTOST SERPL-MCNC: 247.03 NG/DL
TRIGL SERPL-MCNC: 63 MG/DL (ref 30–149)
TSI SER-ACNC: 0.63 MIU/ML (ref 0.36–3.74)
VLDLC SERPL CALC-MCNC: 13 MG/DL (ref 0–30)
WBC # BLD AUTO: 5.8 X10(3) UL (ref 4–11)

## 2020-03-14 PROCEDURE — 84443 ASSAY THYROID STIM HORMONE: CPT

## 2020-03-14 PROCEDURE — 80053 COMPREHEN METABOLIC PANEL: CPT

## 2020-03-14 PROCEDURE — 36415 COLL VENOUS BLD VENIPUNCTURE: CPT

## 2020-03-14 PROCEDURE — 84153 ASSAY OF PSA TOTAL: CPT

## 2020-03-14 PROCEDURE — 84403 ASSAY OF TOTAL TESTOSTERONE: CPT

## 2020-03-14 PROCEDURE — 85025 COMPLETE CBC W/AUTO DIFF WBC: CPT

## 2020-03-14 PROCEDURE — 80061 LIPID PANEL: CPT

## 2020-03-14 PROCEDURE — 84154 ASSAY OF PSA FREE: CPT

## 2020-03-14 PROCEDURE — 84439 ASSAY OF FREE THYROXINE: CPT

## 2020-03-14 PROCEDURE — 84481 FREE ASSAY (FT-3): CPT

## 2020-08-06 ENCOUNTER — OFFICE VISIT (OUTPATIENT)
Dept: SURGERY | Facility: CLINIC | Age: 60
End: 2020-08-06

## 2020-08-06 VITALS
WEIGHT: 189 LBS | SYSTOLIC BLOOD PRESSURE: 133 MMHG | HEART RATE: 61 BPM | DIASTOLIC BLOOD PRESSURE: 83 MMHG | HEIGHT: 66 IN | BODY MASS INDEX: 30.37 KG/M2

## 2020-08-06 DIAGNOSIS — N39.8 VOIDING DYSFUNCTION: ICD-10-CM

## 2020-08-06 DIAGNOSIS — R97.20 ELEVATED PSA: Primary | ICD-10-CM

## 2020-08-06 DIAGNOSIS — R79.89 LOW TESTOSTERONE IN MALE: ICD-10-CM

## 2020-08-06 PROCEDURE — 3075F SYST BP GE 130 - 139MM HG: CPT | Performed by: NURSE PRACTITIONER

## 2020-08-06 PROCEDURE — 99214 OFFICE O/P EST MOD 30 MIN: CPT | Performed by: NURSE PRACTITIONER

## 2020-08-06 PROCEDURE — 3079F DIAST BP 80-89 MM HG: CPT | Performed by: NURSE PRACTITIONER

## 2020-08-06 PROCEDURE — 3008F BODY MASS INDEX DOCD: CPT | Performed by: NURSE PRACTITIONER

## 2020-08-06 RX ORDER — CEFDINIR 300 MG/1
300 CAPSULE ORAL EVERY 12 HOURS
Qty: 6 CAPSULE | Refills: 0 | Status: SHIPPED | OUTPATIENT
Start: 2020-08-06 | End: 2021-06-04 | Stop reason: ALTCHOICE

## 2020-08-06 RX ORDER — CIPROFLOXACIN 500 MG/1
500 TABLET, FILM COATED ORAL 2 TIMES DAILY
Qty: 6 TABLET | Refills: 0 | Status: SHIPPED | OUTPATIENT
Start: 2020-08-06 | End: 2021-06-04

## 2020-08-06 NOTE — PROGRESS NOTES
HPI:    Patient ID: Mónica Hough is a 61year old male. HPI     Patient is a 61year old male who presents to the clinic for a follow up regarding elevated PSA. Patient of Dr. Agosto Haste last seen in the clinic 1/9/19.     Elevated PSA -- denies asso fatigue, fever and unexpected weight change. Respiratory: Negative for cough and shortness of breath. Gastrointestinal: Negative for abdominal pain, constipation, diarrhea, nausea and vomiting. Genitourinary: Positive for frequency.  Negative for dif 5.01 (H) 0.651 13   8/2/2019       3.82 0.450 12   6/15/2019       7.80 (H) 1.480 19   3/14/2019       4.36 (H) 0.833 19   1/13/2019      11:56 AM 4.61 (H)     1/13/2019      11:56 AM 3.9     1/13/2019      11:56 AM  0.480    10/31/2018       4.3 (H) 0 dysfunction  Patient feels that this problem is stable. He is not overly bothered by his urination.        I spent 25  minutes with patient, and greater than 50% of this time was spent face to face discussing treatment options, education, counseling, and c

## 2020-08-06 NOTE — PROGRESS NOTES
Called patient informed that actual mri cd will need to be picked up from [facility]. Patient will  and bring to our office tomorrow.

## 2020-08-06 NOTE — PROGRESS NOTES
Patient seen in office today, will be scheduled for Uronav-prostate biopsy by Vishal Tipton, date and time for now is tbd, went over pre biopsy instructions with patient, I informed that I will call with date and time, patient verbalized understanding.

## 2020-08-10 ENCOUNTER — TELEPHONE (OUTPATIENT)
Dept: SURGERY | Facility: CLINIC | Age: 60
End: 2020-08-10

## 2020-08-10 NOTE — TELEPHONE ENCOUNTER
Olga Mora,  Please let me know if you reviewed all prebiopsy instructions with patient including giving him a paper copy of my preop instructions, writing orders for antibiotics, etc.  Also let me know if you inform him of the 1% chance of urosepsis requiring emergency hospitalization.   Many thanks, Dr. Onur Alvarez

## 2020-08-10 NOTE — TELEPHONE ENCOUNTER
per your request, spoke with patient he did in fact drop off mri cd from [facility], the radiology department has received the cd and it is uploading per [facility] from radiology.     I went over pre-prostate biopsy with patient in office on Thursday 08

## 2020-08-10 NOTE — TELEPHONE ENCOUNTER
Hi Dr. Wells Face,    Yes, I had Trista provide him with the pre op instructions. I also in depth discussed what to expect and possible risks including infection, bleeding, and urinary retention. I explained that he can expect to see blood in his urine and stool as well as his ejaculate fluids for up to 1-2 months. He was agreeable to proceed.       Thank you,  Norma DIEGO  [facility]-Urology

## 2020-08-10 NOTE — TELEPHONE ENCOUNTER
Marcos Rivera,  The following our instructions given to me by radiologist Dr. Cynthia Dickerson, who is the radiologist responsible for MRI–ultrasound fusion biopsies at our hospital  ----  1) once we receive from patient the disc of the [facility] 3T MRI , that disc lisa

## 2020-08-11 NOTE — TELEPHONE ENCOUNTER
Called and spoke with patient and let him know PVK agrees with plan. ABX sent to pharmacy. He was thankful.

## 2020-09-08 ENCOUNTER — TELEPHONE (OUTPATIENT)
Dept: SURGERY | Facility: CLINIC | Age: 60
End: 2020-09-08

## 2020-09-08 NOTE — TELEPHONE ENCOUNTER
Patient has us biopsy 9/10 and is confused about instruction on taking his medications.  Please advise

## 2020-09-10 ENCOUNTER — HOSPITAL ENCOUNTER (OUTPATIENT)
Dept: ULTRASOUND IMAGING | Facility: HOSPITAL | Age: 60
Discharge: HOME | End: 2020-09-10
Attending: NURSE PRACTITIONER
Payer: COMMERCIAL

## 2020-09-10 VITALS — HEIGHT: 66 IN | WEIGHT: 185 LBS | BODY MASS INDEX: 29.73 KG/M2

## 2020-09-10 DIAGNOSIS — R97.20 ELEVATED PSA: ICD-10-CM

## 2020-09-10 PROCEDURE — 76942 ECHO GUIDE FOR BIOPSY: CPT | Performed by: UROLOGY

## 2020-09-10 PROCEDURE — 55700 BIOPSY OF PROSTATE,NEEDLE/PUNCH: CPT | Performed by: UROLOGY

## 2020-09-10 PROCEDURE — 76872 US TRANSRECTAL: CPT | Performed by: UROLOGY

## 2020-09-10 RX ORDER — LIDOCAINE HYDROCHLORIDE 10 MG/ML
INJECTION, SOLUTION EPIDURAL; INFILTRATION; INTRACAUDAL; PERINEURAL
Status: DISCONTINUED
Start: 2020-09-10 | End: 2020-09-11

## 2020-09-10 RX ORDER — LIDOCAINE HYDROCHLORIDE 10 MG/ML
10 INJECTION, SOLUTION EPIDURAL; INFILTRATION; INTRACAUDAL; PERINEURAL ONCE
Status: DISCONTINUED | OUTPATIENT
Start: 2020-09-10 | End: 2020-09-12

## 2020-09-10 NOTE — PROCEDURES
UROLOGY OPERATIVE REPORT     9/10/2020    I performed the procedure in the ultrasound department of [address] [place] [address] DIAGNOSIS:    1. Elevated prostate specific antigen of 4.34 on 3/14/2020  2.        PI-RADS 4  \"high\" (c tolerated the procedure well.         OPERATIVE TECHNIQUE:  Transrectal ultrasound of the prostate and seminal vessels were performed through the entirety of the prostate and transverse and sagittal planes using a IgnitAd ultrasound and 8.0 megahertz on the and

## 2020-09-10 NOTE — IMAGING NOTE
PATIENT ARRIVED TO DEPARTMENT @ 1250. PROCEDURE REVIEWED BY DR Arnie Huerta PRIOR TO HIS ARRIVAL    1330 DR Scott TO SCAN. TIME OUT COMPLETED. PATIENT POSITIONED LEFT LATERAL/FOWLERS POSITION. 1322 HR 61 PO2 SAT 96% /71.      LIDOCAINE 1

## 2020-10-01 ENCOUNTER — OFFICE VISIT (OUTPATIENT)
Dept: SURGERY | Facility: CLINIC | Age: 60
End: 2020-10-01

## 2020-10-01 ENCOUNTER — TELEPHONE (OUTPATIENT)
Dept: SURGERY | Facility: CLINIC | Age: 60
End: 2020-10-01

## 2020-10-01 VITALS — SYSTOLIC BLOOD PRESSURE: 128 MMHG | WEIGHT: 185 LBS | BODY MASS INDEX: 30 KG/M2 | DIASTOLIC BLOOD PRESSURE: 72 MMHG

## 2020-10-01 DIAGNOSIS — R79.89 LOW TESTOSTERONE IN MALE: ICD-10-CM

## 2020-10-01 DIAGNOSIS — Z87.891 FORMER SMOKER: ICD-10-CM

## 2020-10-01 DIAGNOSIS — C61 PROSTATE CANCER (HCC): Primary | ICD-10-CM

## 2020-10-01 DIAGNOSIS — R35.1 NOCTURIA: ICD-10-CM

## 2020-10-01 PROCEDURE — 3074F SYST BP LT 130 MM HG: CPT | Performed by: UROLOGY

## 2020-10-01 PROCEDURE — 99215 OFFICE O/P EST HI 40 MIN: CPT | Performed by: UROLOGY

## 2020-10-01 PROCEDURE — 3078F DIAST BP <80 MM HG: CPT | Performed by: UROLOGY

## 2020-10-01 NOTE — PROGRESS NOTES
HPI:    Patient ID: Edwardo Dover is a 61year old male. HPI     History provided by patient and wife, Talat Kim. Prostate Cancer   Most recent 03/14/2020 PSA = 4.34.  Diagnosed 09/10/2020 MR-ultrasound fusion targeted biopsy; \"PI-RADS 4 'high' (clinic Patient quit smoking in 2000; 20 pack year history. Review of Previous Records: The patient reports that he was on testosterone therapy with Dr. Louisa Santiago.   11/08/2018 Dr. Kuldip Barroso; elevated PSA, referred to urology  01/09/2020 Office Consult wi Eluxadoline (VIBERZI) 100 MG Oral Tab Take 1 tablet by mouth 2 (two) times daily with meals. 180 tablet 1   • Multiple Vitamins-Minerals (ONE-A-DAY MENS 50+ ADVANTAGE) Oral Tab Take 1 tablet by mouth.        Allergies:No Known Allergies    HISTORY:  Past Me your life with your urinary condition just the way it is now, how would you feel about that?: Mostly Satisfied         PHYSICAL EXAM:    Physical Exam   Constitutional: He is oriented to person, place, and time.  He appears well-developed and well-nourished Testosterone = 915  04/28/2018 PSA = 3.6; Creatinine = 0.95; GFR = >60  03/05/2017 UA blood = negative; Microscopic not indicated.    11/26/2016 PSA = 2.4       UROLOGICAL IMAGING   09/10/2020 US BIOPSY PROSTATE URONAV = prostate calculated volume of 35 cc; year of observation therapy. I also explained the reported success rates of this treatment option and the presence of, or absence of long term followup for all of this treatment option.  I review the probability of patient's cancer being organ confined; pro surveillance\"; this is consistent with the guidelines of the American urologic Association based on the characteristics of your cancer. 2.  Visit in 4 months.   Please get blood draw for PSA testosterone--free and total 1--10 days before visit: Please h

## 2020-10-01 NOTE — TELEPHONE ENCOUNTER
Mirna Dickerson,  Patient Javier Huang. Maria Esther Reed has very low risk, very low volume, [place] 3+3 adenocarcinoma the prostate, PSA 4.34. Based on current guidelines of American urological Association, treatment of choice is active surveillance but I did discuss with patient all of the different treatment options. Patient decided to go with active surveillance with visit/PSA every 4 to 6 months and then in 1 year, 3T MRI versus repeat biopsy. I will keep you posted.     Many thanks,     Thor Peña, urology

## 2020-10-01 NOTE — PATIENT INSTRUCTIONS
Abhinav Mcneill M.D.    1.  For now you have chosen to treat your prostate cancer with \"active surveillance\"; this is consistent with the guidelines of the American urologic Association based on the characteristics

## 2021-01-02 ENCOUNTER — LAB ENCOUNTER (OUTPATIENT)
Dept: LAB | Age: 61
End: 2021-01-02
Attending: UROLOGY
Payer: COMMERCIAL

## 2021-01-02 DIAGNOSIS — R79.89 LOW TESTOSTERONE IN MALE: ICD-10-CM

## 2021-01-02 DIAGNOSIS — C61 PROSTATE CANCER (HCC): ICD-10-CM

## 2021-01-02 LAB — PSA SERPL-MCNC: 4.81 NG/ML (ref ?–4)

## 2021-01-02 PROCEDURE — 36415 COLL VENOUS BLD VENIPUNCTURE: CPT

## 2021-01-02 PROCEDURE — 84402 ASSAY OF FREE TESTOSTERONE: CPT

## 2021-01-02 PROCEDURE — 84153 ASSAY OF PSA TOTAL: CPT

## 2021-01-02 PROCEDURE — 84403 ASSAY OF TOTAL TESTOSTERONE: CPT

## 2021-01-06 LAB
TESTOSTERONE, FREE, S: 8.35 NG/DL
TESTOSTERONE, TOTAL, S: 363 NG/DL

## 2021-01-15 ENCOUNTER — OFFICE VISIT (OUTPATIENT)
Dept: SURGERY | Facility: CLINIC | Age: 61
End: 2021-01-15

## 2021-01-15 VITALS
HEART RATE: 66 BPM | DIASTOLIC BLOOD PRESSURE: 81 MMHG | BODY MASS INDEX: 31 KG/M2 | SYSTOLIC BLOOD PRESSURE: 121 MMHG | WEIGHT: 190 LBS

## 2021-01-15 DIAGNOSIS — N40.1 BENIGN PROSTATIC HYPERPLASIA WITH URINARY FREQUENCY: ICD-10-CM

## 2021-01-15 DIAGNOSIS — R35.0 BENIGN PROSTATIC HYPERPLASIA WITH URINARY FREQUENCY: ICD-10-CM

## 2021-01-15 DIAGNOSIS — R35.1 NOCTURIA: ICD-10-CM

## 2021-01-15 DIAGNOSIS — R39.15 URINARY URGENCY: ICD-10-CM

## 2021-01-15 DIAGNOSIS — Z87.891 HISTORY OF SMOKING 10-25 PACK YEARS: ICD-10-CM

## 2021-01-15 DIAGNOSIS — C61 PROSTATE CANCER (HCC): Primary | ICD-10-CM

## 2021-01-15 LAB
BACTERIA UR QL AUTO: NEGATIVE /HPF
BILIRUB UR QL: NEGATIVE
COLOR UR: YELLOW
GLUCOSE UR-MCNC: NEGATIVE MG/DL
HGB UR QL STRIP.AUTO: NEGATIVE
KETONES UR-MCNC: NEGATIVE MG/DL
NITRITE UR QL STRIP.AUTO: NEGATIVE
PH UR: 6 [PH] (ref 5–8)
PROT UR-MCNC: NEGATIVE MG/DL
RBC #/AREA URNS AUTO: <1 /HPF
SP GR UR STRIP: 1.01 (ref 1–1.03)
UROBILINOGEN UR STRIP-ACNC: <2
WBC #/AREA URNS AUTO: 2 /HPF

## 2021-01-15 PROCEDURE — 99215 OFFICE O/P EST HI 40 MIN: CPT | Performed by: UROLOGY

## 2021-01-15 PROCEDURE — 3079F DIAST BP 80-89 MM HG: CPT | Performed by: UROLOGY

## 2021-01-15 PROCEDURE — 3074F SYST BP LT 130 MM HG: CPT | Performed by: UROLOGY

## 2021-01-15 RX ORDER — TAMSULOSIN HYDROCHLORIDE 0.4 MG/1
0.4 CAPSULE ORAL DAILY
Qty: 90 CAPSULE | Refills: 3 | Status: SHIPPED | OUTPATIENT
Start: 2021-01-15 | End: 2021-02-14

## 2021-01-15 NOTE — PROGRESS NOTES
HPI:    Patient ID: Edwardo Dover is a 61year old male. HPI       Prostate Cancer   Known prostate cancer--has chosen active surveillance. Most recent 1/2/2021 PSA 4.81 compared to 03/14/2020 PSA = 4.34.  Diagnosed 09/10/2020 MR-ultrasound fusion targ smoking in 2000; 20 pack year history. Review of Previous Records: The patient reports that he was on testosterone therapy with Dr. Lonnie Carter.   11/08/2018 Dr. Radha Olea; elevated PSA, referred to urology  01/09/2020 Office Consult with me; father h 100 MG Oral Tab Take 1 tablet by mouth 2 (two) times daily with meals. 180 tablet 1   • Multiple Vitamins-Minerals (ONE-A-DAY MENS 50+ ADVANTAGE) Oral Tab Take 1 tablet by mouth.      • cefdinir 300 MG Oral Cap Take 1 capsule (300 mg total) by mouth every 1 palpable nodules or indurations. Musculoskeletal: Normal range of motion. Neurological: He is alert and oriented to person, place, and time. Skin: Skin is dry. Psychiatric: He has a normal mood and affect.  Thought content normal.   Nursing note r medications, allergies, laboratories and imaging studies with patient, discussing treatment options, answering questions about treatment and coordinating care, then documenting the encounter.       ASSESSMENT/PLAN:   Prostate cancer (hcc)  (primary encounte is mildly better than before and he is not on any alpha blockers or prostate medication.    To investigate the worsening in the urinary urgency, I recommend complete urinalysis and urine for cytology and urine culture today, especially with his history of s performing the biopsy. No orders of the defined types were placed in this encounter.       Meds This Visit:  Requested Prescriptions      No prescriptions requested or ordered in this encounter       Imaging & Referrals:  None

## 2021-01-15 NOTE — PATIENT INSTRUCTIONS
Mi Dumont M.D.    1.  Urine specimen today for complete urinalysis and urine for cytology and urine culture--we will notify you of all of these results.     2.    Please start tamsulosin 0.4 mg daily; freddy 39.4

## 2021-06-04 PROCEDURE — 80053 COMPREHEN METABOLIC PANEL: CPT | Performed by: INTERNAL MEDICINE

## 2021-06-04 PROCEDURE — 84443 ASSAY THYROID STIM HORMONE: CPT | Performed by: INTERNAL MEDICINE

## 2021-06-04 PROCEDURE — 84481 FREE ASSAY (FT-3): CPT | Performed by: INTERNAL MEDICINE

## 2021-06-04 PROCEDURE — 85025 COMPLETE CBC W/AUTO DIFF WBC: CPT | Performed by: INTERNAL MEDICINE

## 2021-06-04 PROCEDURE — 84439 ASSAY OF FREE THYROXINE: CPT | Performed by: INTERNAL MEDICINE

## 2021-06-04 PROCEDURE — 82306 VITAMIN D 25 HYDROXY: CPT | Performed by: INTERNAL MEDICINE

## 2021-06-04 PROCEDURE — 80061 LIPID PANEL: CPT | Performed by: INTERNAL MEDICINE

## 2021-11-23 ENCOUNTER — LAB ENCOUNTER (OUTPATIENT)
Dept: LAB | Age: 61
End: 2021-11-23
Attending: INTERNAL MEDICINE
Payer: COMMERCIAL

## 2021-11-23 DIAGNOSIS — R97.20 ELEVATED PSA: ICD-10-CM

## 2021-11-23 PROCEDURE — 84153 ASSAY OF PSA TOTAL: CPT

## 2021-11-23 PROCEDURE — 36415 COLL VENOUS BLD VENIPUNCTURE: CPT

## 2022-01-29 ENCOUNTER — LAB ENCOUNTER (OUTPATIENT)
Dept: LAB | Age: 62
End: 2022-01-29
Attending: INTERNAL MEDICINE
Payer: COMMERCIAL

## 2022-01-29 DIAGNOSIS — R97.20 ELEVATED PSA: ICD-10-CM

## 2022-01-29 LAB — PSA SERPL-MCNC: 6.17 NG/ML (ref ?–4)

## 2022-01-29 PROCEDURE — 84153 ASSAY OF PSA TOTAL: CPT

## 2022-01-29 PROCEDURE — 36415 COLL VENOUS BLD VENIPUNCTURE: CPT

## 2022-03-14 ENCOUNTER — OFFICE VISIT (OUTPATIENT)
Dept: SURGERY | Facility: CLINIC | Age: 62
End: 2022-03-14
Payer: COMMERCIAL

## 2022-03-14 VITALS
DIASTOLIC BLOOD PRESSURE: 87 MMHG | HEART RATE: 54 BPM | WEIGHT: 190 LBS | SYSTOLIC BLOOD PRESSURE: 129 MMHG | RESPIRATION RATE: 16 BRPM | HEIGHT: 67 IN | BODY MASS INDEX: 29.82 KG/M2

## 2022-03-14 DIAGNOSIS — C61 PROSTATE CANCER (HCC): Primary | ICD-10-CM

## 2022-03-14 DIAGNOSIS — R35.0 BENIGN PROSTATIC HYPERPLASIA WITH URINARY FREQUENCY: ICD-10-CM

## 2022-03-14 DIAGNOSIS — R97.20 ELEVATED PSA: ICD-10-CM

## 2022-03-14 DIAGNOSIS — Z87.891 HISTORY OF SMOKING 10-25 PACK YEARS: ICD-10-CM

## 2022-03-14 DIAGNOSIS — R39.15 URINARY URGENCY: ICD-10-CM

## 2022-03-14 DIAGNOSIS — N40.1 BENIGN PROSTATIC HYPERPLASIA WITH URINARY FREQUENCY: ICD-10-CM

## 2022-03-14 PROCEDURE — 3008F BODY MASS INDEX DOCD: CPT | Performed by: UROLOGY

## 2022-03-14 PROCEDURE — 3079F DIAST BP 80-89 MM HG: CPT | Performed by: UROLOGY

## 2022-03-14 PROCEDURE — 99214 OFFICE O/P EST MOD 30 MIN: CPT | Performed by: UROLOGY

## 2022-03-14 PROCEDURE — 3074F SYST BP LT 130 MM HG: CPT | Performed by: UROLOGY

## 2022-03-28 ENCOUNTER — TELEPHONE (OUTPATIENT)
Dept: SURGERY | Facility: CLINIC | Age: 62
End: 2022-03-28

## 2022-03-28 NOTE — TELEPHONE ENCOUNTER
Pt was wondering if MRI is with contrast. Let pt know that it looks like it will be done with IV contrast. Pt states he believes that he will have a higher out of pocket cost with contrast, and will have to figure this out and call us back in case he decides to have it done elsewhere for less cost. Told him to just call our office back when he decides on where to have it done.

## 2022-04-14 ENCOUNTER — HOSPITAL ENCOUNTER (OUTPATIENT)
Dept: MRI IMAGING | Facility: HOSPITAL | Age: 62
Discharge: HOME | End: 2022-04-14
Attending: UROLOGY
Payer: COMMERCIAL

## 2022-04-14 DIAGNOSIS — C61 PROSTATE CANCER (HCC): ICD-10-CM

## 2022-04-14 DIAGNOSIS — R97.20 ELEVATED PSA: ICD-10-CM

## 2022-04-14 LAB — CREAT BLD-MCNC: 1 MG/DL

## 2022-04-14 PROCEDURE — 82565 ASSAY OF CREATININE: CPT

## 2022-04-14 PROCEDURE — 72197 MRI PELVIS W/O & W/DYE: CPT | Performed by: UROLOGY

## 2022-04-14 PROCEDURE — A9575 INJ GADOTERATE MEGLUMI 0.1ML: HCPCS | Performed by: UROLOGY

## 2022-04-19 ENCOUNTER — TELEPHONE (OUTPATIENT)
Dept: SURGERY | Facility: CLINIC | Age: 62
End: 2022-04-19

## 2022-04-19 NOTE — TELEPHONE ENCOUNTER
S/W pt and informed him of al of the info and instructions in PVK's results msg as stated below and pt verbalized understanding and took an appt for Fri. 4/29 at 1 pm.

## 2022-04-19 NOTE — TELEPHONE ENCOUNTER
----- Message from Maldonado Guevara MD sent at 4/15/2022  6:59 PM CDT -----  Urology nurses, please call patient and please give him an appointment to see me to discuss the following--    4/15/22 MRI of the prostate interpreted as having a \"PI-RADS 4\" lesion meaning  That clinically significant prostate cancer is likely to be present. We know patient has prostate cancer because he is on active surveillance for it, however, this particular score of PI-RADS 4 raises the concern that patient should have a follow-up prostate biopsy to make sure that his prostate cancer is not becoming more aggressive. Noteworthy is that the MRI showed no evidence of any spread of cancer to the lymph nodes or to the bones in the pelvis. I will discuss all these matters with him in person when I see patient in the office.   I wish patient the best of health, Dr. Bubba Bustamante

## 2022-04-29 ENCOUNTER — OFFICE VISIT (OUTPATIENT)
Dept: SURGERY | Facility: CLINIC | Age: 62
End: 2022-04-29
Payer: COMMERCIAL

## 2022-04-29 VITALS — WEIGHT: 190 LBS | BODY MASS INDEX: 30 KG/M2

## 2022-04-29 DIAGNOSIS — R97.20 ELEVATED PSA: ICD-10-CM

## 2022-04-29 DIAGNOSIS — R97.20 RISING PSA LEVEL: ICD-10-CM

## 2022-04-29 DIAGNOSIS — Z87.891 HISTORY OF SMOKING 10-25 PACK YEARS: ICD-10-CM

## 2022-04-29 DIAGNOSIS — R39.15 URINARY URGENCY: ICD-10-CM

## 2022-04-29 DIAGNOSIS — C61 PROSTATE CANCER (HCC): Primary | ICD-10-CM

## 2022-04-29 PROCEDURE — 99214 OFFICE O/P EST MOD 30 MIN: CPT | Performed by: UROLOGY

## 2022-05-11 ENCOUNTER — LAB ENCOUNTER (OUTPATIENT)
Dept: LAB | Age: 62
End: 2022-05-11
Attending: UROLOGY
Payer: COMMERCIAL

## 2022-05-11 DIAGNOSIS — R39.15 URINARY URGENCY: ICD-10-CM

## 2022-05-11 DIAGNOSIS — R97.20 RISING PSA LEVEL: ICD-10-CM

## 2022-05-11 DIAGNOSIS — C61 PROSTATE CANCER (HCC): ICD-10-CM

## 2022-05-11 LAB
BILIRUB UR QL: NEGATIVE
CLARITY UR: CLEAR
COLOR UR: YELLOW
GLUCOSE UR-MCNC: NEGATIVE MG/DL
HGB UR QL STRIP.AUTO: NEGATIVE
LEUKOCYTE ESTERASE UR QL STRIP.AUTO: NEGATIVE
NITRITE UR QL STRIP.AUTO: NEGATIVE
PH UR: 5 [PH] (ref 5–8)
PROT UR-MCNC: NEGATIVE MG/DL
PSA SERPL-MCNC: 5.74 NG/ML (ref ?–4)
SP GR UR STRIP: 1.02 (ref 1–1.03)
UROBILINOGEN UR STRIP-ACNC: <2
VIT C UR-MCNC: 40 MG/DL

## 2022-05-11 PROCEDURE — 81001 URINALYSIS AUTO W/SCOPE: CPT

## 2022-05-11 PROCEDURE — 36415 COLL VENOUS BLD VENIPUNCTURE: CPT

## 2022-05-11 PROCEDURE — 84153 ASSAY OF PSA TOTAL: CPT

## 2022-07-28 ENCOUNTER — HOSPITAL ENCOUNTER (OUTPATIENT)
Dept: GENERAL RADIOLOGY | Age: 62
Discharge: HOME | End: 2022-07-28
Attending: INTERNAL MEDICINE
Payer: COMMERCIAL

## 2022-07-28 DIAGNOSIS — G89.29 CHRONIC PAIN OF BOTH KNEES: ICD-10-CM

## 2022-07-28 DIAGNOSIS — M25.562 CHRONIC PAIN OF BOTH KNEES: ICD-10-CM

## 2022-07-28 DIAGNOSIS — M25.561 CHRONIC PAIN OF BOTH KNEES: ICD-10-CM

## 2022-07-28 PROCEDURE — 73564 X-RAY EXAM KNEE 4 OR MORE: CPT | Performed by: INTERNAL MEDICINE

## 2022-08-09 PROBLEM — M17.11 PRIMARY OSTEOARTHRITIS OF RIGHT KNEE: Status: ACTIVE | Noted: 2022-08-09

## 2022-09-19 PROBLEM — M25.562 CHRONIC PAIN OF LEFT KNEE: Status: ACTIVE | Noted: 2022-09-19

## 2022-09-19 PROBLEM — G89.29 CHRONIC PAIN OF LEFT KNEE: Status: ACTIVE | Noted: 2022-09-19

## 2022-10-23 ENCOUNTER — HOSPITAL ENCOUNTER (OUTPATIENT)
Age: 62
Discharge: HOME | End: 2022-10-23
Payer: COMMERCIAL

## 2022-10-23 VITALS
TEMPERATURE: 97 F | RESPIRATION RATE: 18 BRPM | HEART RATE: 68 BPM | DIASTOLIC BLOOD PRESSURE: 80 MMHG | OXYGEN SATURATION: 97 % | SYSTOLIC BLOOD PRESSURE: 133 MMHG

## 2022-10-23 PROCEDURE — 90471 IMMUNIZATION ADMIN: CPT

## 2022-11-12 ENCOUNTER — LAB ENCOUNTER (OUTPATIENT)
Dept: LAB | Age: 62
End: 2022-11-12
Attending: NURSE PRACTITIONER
Payer: COMMERCIAL

## 2022-11-12 DIAGNOSIS — C61 PROSTATE CANCER (HCC): ICD-10-CM

## 2022-11-12 LAB — PSA SERPL-MCNC: 4.78 NG/ML (ref ?–4)

## 2022-11-12 PROCEDURE — 36415 COLL VENOUS BLD VENIPUNCTURE: CPT

## 2022-11-12 PROCEDURE — 84153 ASSAY OF PSA TOTAL: CPT

## 2022-12-01 ENCOUNTER — HOSPITAL ENCOUNTER (OUTPATIENT)
Dept: GENERAL RADIOLOGY | Age: 62
Discharge: HOME | End: 2022-12-01
Attending: INTERNAL MEDICINE
Payer: COMMERCIAL

## 2022-12-01 DIAGNOSIS — M54.16 LUMBAR RADICULOPATHY: ICD-10-CM

## 2022-12-01 PROCEDURE — 84481 FREE ASSAY (FT-3): CPT | Performed by: INTERNAL MEDICINE

## 2022-12-01 PROCEDURE — 85025 COMPLETE CBC W/AUTO DIFF WBC: CPT | Performed by: INTERNAL MEDICINE

## 2022-12-01 PROCEDURE — 80061 LIPID PANEL: CPT | Performed by: INTERNAL MEDICINE

## 2022-12-01 PROCEDURE — 72110 X-RAY EXAM L-2 SPINE 4/>VWS: CPT | Performed by: INTERNAL MEDICINE

## 2022-12-01 PROCEDURE — 82306 VITAMIN D 25 HYDROXY: CPT | Performed by: INTERNAL MEDICINE

## 2022-12-01 PROCEDURE — 80053 COMPREHEN METABOLIC PANEL: CPT | Performed by: INTERNAL MEDICINE

## 2022-12-01 PROCEDURE — 84439 ASSAY OF FREE THYROXINE: CPT | Performed by: INTERNAL MEDICINE

## 2022-12-01 PROCEDURE — 84443 ASSAY THYROID STIM HORMONE: CPT | Performed by: INTERNAL MEDICINE

## 2023-05-01 ENCOUNTER — OFFICE VISIT (OUTPATIENT)
Dept: SURGERY | Facility: CLINIC | Age: 63
End: 2023-05-01

## 2023-05-01 VITALS — SYSTOLIC BLOOD PRESSURE: 135 MMHG | HEART RATE: 57 BPM | DIASTOLIC BLOOD PRESSURE: 84 MMHG

## 2023-05-01 DIAGNOSIS — C61 PROSTATE CANCER (HCC): Primary | ICD-10-CM

## 2023-05-01 DIAGNOSIS — R35.0 BENIGN PROSTATIC HYPERPLASIA WITH URINARY FREQUENCY: ICD-10-CM

## 2023-05-01 DIAGNOSIS — N40.1 BENIGN PROSTATIC HYPERPLASIA WITH URINARY FREQUENCY: ICD-10-CM

## 2023-05-01 PROCEDURE — 3075F SYST BP GE 130 - 139MM HG: CPT | Performed by: UROLOGY

## 2023-05-01 PROCEDURE — 99214 OFFICE O/P EST MOD 30 MIN: CPT | Performed by: UROLOGY

## 2023-05-01 PROCEDURE — 3079F DIAST BP 80-89 MM HG: CPT | Performed by: UROLOGY

## 2023-05-01 RX ORDER — TADALAFIL 5 MG/1
5 TABLET ORAL
Qty: 90 TABLET | Refills: 3 | Status: SHIPPED | OUTPATIENT
Start: 2023-05-01

## 2023-06-05 ENCOUNTER — TELEPHONE (OUTPATIENT)
Dept: SURGERY | Facility: CLINIC | Age: 63
End: 2023-06-05

## 2023-06-05 RX ORDER — TADALAFIL 5 MG/1
5 TABLET ORAL
Qty: 90 TABLET | Refills: 3 | Status: SHIPPED | OUTPATIENT
Start: 2023-06-05

## 2023-06-05 NOTE — TELEPHONE ENCOUNTER
Called patient and confirmed full name and . I explained that the medication needs prior approval and that I just submitted it to his insurance. Pt verbalized his understanding.

## 2023-06-05 NOTE — TELEPHONE ENCOUNTER
Received fax approval from [employer] grated Tadalafil 5mg 6/5/23 through 6/5/24. I called pt to notify him and he is thankful I then called pharmacy in [address] [place] [address] to inform pharmacy and they had no script so I reordered it and sent to pharmacy confirmed with pt.

## 2023-06-12 ENCOUNTER — HOSPITAL ENCOUNTER (OUTPATIENT)
Dept: GENERAL RADIOLOGY | Age: 63
Discharge: HOME | End: 2023-06-12
Attending: INTERNAL MEDICINE
Payer: COMMERCIAL

## 2023-06-12 DIAGNOSIS — M25.511 ACUTE PAIN OF RIGHT SHOULDER: ICD-10-CM

## 2023-06-12 PROCEDURE — 73030 X-RAY EXAM OF SHOULDER: CPT | Performed by: INTERNAL MEDICINE

## 2023-07-09 ENCOUNTER — HOSPITAL ENCOUNTER (OUTPATIENT)
Dept: CT IMAGING | Age: 63
Discharge: HOME | End: 2023-07-09
Attending: INTERNAL MEDICINE

## 2023-07-09 DIAGNOSIS — E78.00 PURE HYPERCHOLESTEROLEMIA: ICD-10-CM

## 2023-07-13 PROBLEM — M79.89 MASS OF SOFT TISSUE OF RIGHT UPPER EXTREMITY: Status: ACTIVE | Noted: 2023-07-13

## 2023-07-13 PROBLEM — M19.019 ARTHRITIS OF SHOULDER REGION: Status: ACTIVE | Noted: 2023-07-13

## 2023-07-15 ENCOUNTER — LAB ENCOUNTER (OUTPATIENT)
Dept: LAB | Age: 63
End: 2023-07-15
Attending: ORTHOPAEDIC SURGERY
Payer: COMMERCIAL

## 2023-07-15 DIAGNOSIS — Z01.818 PREPROCEDURAL EXAMINATION: ICD-10-CM

## 2023-07-15 LAB
BUN BLD-MCNC: 15 MG/DL (ref 7–18)
CREAT BLD-MCNC: 1.17 MG/DL
GFR SERPLBLD BASED ON 1.73 SQ M-ARVRAT: 70 ML/MIN/1.73M2 (ref 60–?)

## 2023-07-15 PROCEDURE — 84520 ASSAY OF UREA NITROGEN: CPT

## 2023-07-15 PROCEDURE — 82565 ASSAY OF CREATININE: CPT

## 2023-07-15 PROCEDURE — 36415 COLL VENOUS BLD VENIPUNCTURE: CPT

## 2023-08-09 ENCOUNTER — HOSPITAL ENCOUNTER (OUTPATIENT)
Dept: MRI IMAGING | Age: 63
Discharge: HOME | End: 2023-08-09
Attending: ORTHOPAEDIC SURGERY
Payer: COMMERCIAL

## 2023-08-09 DIAGNOSIS — M25.511 RIGHT SHOULDER PAIN, UNSPECIFIED CHRONICITY: ICD-10-CM

## 2023-08-09 DIAGNOSIS — M79.89 MASS OF SOFT TISSUE OF RIGHT UPPER EXTREMITY: ICD-10-CM

## 2023-08-09 PROCEDURE — 73223 MRI JOINT UPR EXTR W/O&W/DYE: CPT | Performed by: ORTHOPAEDIC SURGERY

## 2023-08-09 PROCEDURE — A9575 INJ GADOTERATE MEGLUMI 0.1ML: HCPCS | Performed by: ORTHOPAEDIC SURGERY

## 2023-08-09 RX ORDER — GADOTERATE MEGLUMINE 376.9 MG/ML
15 INJECTION INTRAVENOUS
Status: COMPLETED | OUTPATIENT
Start: 2023-08-09 | End: 2023-08-09

## 2023-08-09 RX ADMIN — GADOTERATE MEGLUMINE 15 ML: 376.9 INJECTION INTRAVENOUS at 18:34:00

## 2023-10-09 PROBLEM — M25.511 RIGHT SHOULDER PAIN: Status: ACTIVE | Noted: 2023-10-09

## 2023-10-09 PROBLEM — M19.011 ARTHRITIS OF RIGHT SHOULDER REGION: Status: ACTIVE | Noted: 2023-10-09

## 2023-11-27 ENCOUNTER — HOSPITAL ENCOUNTER (OUTPATIENT)
Dept: CT IMAGING | Age: 63
Discharge: HOME | End: 2023-11-27
Attending: INTERNAL MEDICINE
Payer: COMMERCIAL

## 2023-11-27 DIAGNOSIS — R91.8 MULTIPLE LUNG NODULES ON CT: ICD-10-CM

## 2023-11-27 LAB
CREAT BLD-MCNC: 1.1 MG/DL
EGFRCR SERPLBLD CKD-EPI 2021: 75 ML/MIN/1.73M2 (ref 60–?)

## 2023-11-27 PROCEDURE — 71260 CT THORAX DX C+: CPT | Performed by: INTERNAL MEDICINE

## 2023-11-27 PROCEDURE — 82565 ASSAY OF CREATININE: CPT

## 2023-12-11 RX ORDER — TADALAFIL 5 MG/1
5 TABLET ORAL DAILY PRN
Qty: 30 TABLET | Refills: 0 | Status: SHIPPED | OUTPATIENT
Start: 2023-12-11 | End: 2023-12-11

## 2023-12-11 RX ORDER — TADALAFIL 5 MG/1
5 TABLET ORAL
Qty: 90 TABLET | Refills: 3 | Status: SHIPPED | OUTPATIENT
Start: 2023-12-11

## 2023-12-22 PROCEDURE — 80061 LIPID PANEL: CPT | Performed by: INTERNAL MEDICINE

## 2023-12-22 PROCEDURE — 85025 COMPLETE CBC W/AUTO DIFF WBC: CPT | Performed by: INTERNAL MEDICINE

## 2023-12-22 PROCEDURE — 84443 ASSAY THYROID STIM HORMONE: CPT | Performed by: INTERNAL MEDICINE

## 2023-12-22 PROCEDURE — 84439 ASSAY OF FREE THYROXINE: CPT | Performed by: INTERNAL MEDICINE

## 2023-12-22 PROCEDURE — 82550 ASSAY OF CK (CPK): CPT | Performed by: INTERNAL MEDICINE

## 2023-12-22 PROCEDURE — 80053 COMPREHEN METABOLIC PANEL: CPT | Performed by: INTERNAL MEDICINE

## 2023-12-22 PROCEDURE — 84481 FREE ASSAY (FT-3): CPT | Performed by: INTERNAL MEDICINE

## 2024-01-29 ENCOUNTER — TELEPHONE (OUTPATIENT)
Dept: SURGERY | Facility: CLINIC | Age: 64
End: 2024-01-29

## 2024-01-29 NOTE — TELEPHONE ENCOUNTER
Pt called stating pt is scheduled for an appointment on 2-13-24.  Pt had a physical and psa blood test done end of December 2023.   Results ok for the appointment or should pt have another test done.  Please call pt

## 2024-01-31 NOTE — TELEPHONE ENCOUNTER
-LM on VM with pt's greeting.   -I read message from [facility] stating pt did not require a repeat PSA prior to his 2/13/24 OV.  -Encounter complete.

## 2024-02-08 ENCOUNTER — HOSPITAL ENCOUNTER (OUTPATIENT)
Dept: NUCLEAR MEDICINE | Facility: HOSPITAL | Age: 64
Discharge: HOME | End: 2024-02-08
Attending: INTERNAL MEDICINE
Payer: COMMERCIAL

## 2024-02-08 ENCOUNTER — HOSPITAL ENCOUNTER (OUTPATIENT)
Dept: CV DIAGNOSTICS | Facility: HOSPITAL | Age: 64
Discharge: HOME | End: 2024-02-08
Attending: INTERNAL MEDICINE
Payer: COMMERCIAL

## 2024-02-08 DIAGNOSIS — I25.110 ATHEROSCLEROTIC HEART DISEASE OF NATIVE CORONARY ARTERY WITH UNSTABLE ANGINA PECTORIS (HCC): ICD-10-CM

## 2024-02-08 LAB
% OF MAX PREDICTED HR: 100 %
MAX DIASTOLIC BP: 74 MMHG
MAX HEART RATE: 84 BPM
MAX PREDICTED HEART RATE: 157 BPM
MAX SYSTOLIC BP: 132 MMHG
MAX WORK LOAD: 10

## 2024-02-08 PROCEDURE — 93018 CV STRESS TEST I&R ONLY: CPT | Performed by: INTERNAL MEDICINE

## 2024-02-08 PROCEDURE — 78452 HT MUSCLE IMAGE SPECT MULT: CPT | Performed by: INTERNAL MEDICINE

## 2024-02-08 PROCEDURE — 93017 CV STRESS TEST TRACING ONLY: CPT | Performed by: INTERNAL MEDICINE

## 2024-02-08 PROCEDURE — 93016 CV STRESS TEST SUPVJ ONLY: CPT | Performed by: INTERNAL MEDICINE

## 2024-02-08 RX ORDER — REGADENOSON 0.08 MG/ML
INJECTION, SOLUTION INTRAVENOUS
Status: COMPLETED
Start: 2024-02-08 | End: 2024-02-08

## 2024-02-08 RX ADMIN — REGADENOSON 0.4 MG: 0.08 INJECTION, SOLUTION INTRAVENOUS at 09:33:00

## 2024-02-08 NOTE — IMAGING NOTE
0853 Patient c/o bad right knee pain and states unable to walk on the treadmill. Dr. Vera notified with order to change test to NUC Regadenoson stress test. Wiil proceed with new order.

## 2024-02-13 ENCOUNTER — OFFICE VISIT (OUTPATIENT)
Dept: SURGERY | Facility: CLINIC | Age: 64
End: 2024-02-13
Payer: COMMERCIAL

## 2024-02-13 DIAGNOSIS — R35.0 BENIGN PROSTATIC HYPERPLASIA WITH URINARY FREQUENCY: ICD-10-CM

## 2024-02-13 DIAGNOSIS — C61 PROSTATE CANCER (HCC): Primary | ICD-10-CM

## 2024-02-13 DIAGNOSIS — N40.1 BENIGN PROSTATIC HYPERPLASIA WITH URINARY FREQUENCY: ICD-10-CM

## 2024-02-13 PROCEDURE — 99213 OFFICE O/P EST LOW 20 MIN: CPT | Performed by: UROLOGY

## 2024-02-13 NOTE — PROGRESS NOTES
Jose Gonzalez is a 63 year old male.    HPI:     Chief Complaint   Patient presents with    Prostate Cancer       Ref Range & Units 23 10:59 AM  Prostate Specific Antigen Screen  <=4.00 3.29        BPH     Pt states tadalafil 5mg daily is helping        63-year-old male previous patient of my partners Dr. Spencer with a history of prostate cancer, low risk clinical T1c [place] 3+3 PSA 7.8 diagnosed on prostate biopsy 2020 currently on active surveillance.  Prostate MRI performed in  at [facility] demonstrated PI-RADS 4 classification.  MRI was repeated 2022 showing no change.  He had refused consideration for follow-up prostate biopsy.  Feels well.  I started him at his last visit May 1, 2023 on tadalafil 5 mg daily for both some lower urinary tract symptoms and erectile dysfunction.  He reports improvement in both symptoms.  IPSS score today is 11, quality-of-life index is 2.  No bone pain or weight loss.  Digital prostate exam performed at last visit was unremarkable.  Repeat PSA performed 2023 3.29 improved compared with 5..      HISTORY:  Past Medical History:   Diagnosis Date    Numbness and tingling     Osteoarthritis       Past Surgical History:   Procedure Laterality Date    COLONOSCOPY      KNEE SURGERY Left 3-    left knee arthroscopy      Family History   Problem Relation Age of Onset    Cancer Father         bladder    Diabetes Father     Diabetes Mother     Cancer Son         kidney tumor      Social History:   Social History     Socioeconomic History    Marital status:    Tobacco Use    Smoking status: Former     Years: 20     Types: Cigarettes     Quit date: 3/1/1997     Years since quittin.9    Smokeless tobacco: Never   Substance and Sexual Activity    Alcohol use: Yes     Alcohol/week: 4.0 standard drinks of alcohol     Types: 4 Cans of beer per week    Drug use: No   Other Topics Concern     Caffeine Concern Yes     Comment: coffee 6 cups daily    Exercise No    Seat Belt No    Special Diet No    Stress Concern No    Weight Concern No        Medications (Active prior to today's visit):  Current Outpatient Medications   Medication Sig Dispense Refill    atorvastatin 40 MG Oral Tab TAKE ONE TABLET BY MOUTH NIGHTLY 90 tablet 1    tadalafil 5 MG Oral Tab Take 1 tablet (5 mg total) by mouth daily as needed for Erectile Dysfunction. 90 tablet 3    DULoxetine 60 MG Oral Cap DR Particles TAKE ONE CAPSULE BY MOUTH ONE TIME DAILY 90 capsule 1    LEVOTHYROXINE 100 MCG Oral Tab Take 1 tablet by mouth daily in the morning before breakfast 90 tablet 0    Meloxicam 15 MG Oral Tab TAKE ONE TABLET BY MOUTH ONE TIME DAILY WITH FOOD 90 tablet 1    Multiple Vitamins-Minerals (ONE-A-DAY MENS 50+ ADVANTAGE) Oral Tab Take 1 tablet by mouth.         Allergies:  No Known Allergies      ROS:       PHYSICAL EXAM:        ASSESSMENT/PLAN:   Assessment   Encounter Diagnoses   Name Primary?    Prostate cancer (HCC) Yes    Benign prostatic hyperplasia with urinary frequency        Recommend:  - Discussed options for management.  I again stressed the importance of a repeat prostate biopsy but he continues to wish to hold off at this time.  We agreed that he would return to clinic in 6 months with a repeat PSA and digital prostate exam at that time.  Continue on tadalafil 5 mg daily in the meantime.         Orders This Visit:  Orders Placed This Encounter   Procedures    PSA Diagnostic [E]       Meds This Visit:  Requested Prescriptions      No prescriptions requested or ordered in this encounter       Imaging & Referrals:  None     2/13/2024  Paulo Correa MD

## 2024-04-26 ENCOUNTER — TELEPHONE (OUTPATIENT)
Dept: SURGERY | Facility: CLINIC | Age: 64
End: 2024-04-26

## 2024-04-26 NOTE — TELEPHONE ENCOUNTER
Prior auth started through Cover[employer] for Tadalafil 5mg.  Key: [id number]  Will await for response.

## 2024-04-26 NOTE — TELEPHONE ENCOUNTER
Received fax from [employer] in regards to request for Tadalafil 5mg.     Per pharmacy message, \"This product does not require authorization at this time, and may be covered at the pharmacy in accordance with your benefit plan\".     Will send to scanning.

## 2024-08-10 ENCOUNTER — LAB ENCOUNTER (OUTPATIENT)
Dept: LAB | Age: 64
End: 2024-08-10
Attending: UROLOGY
Payer: COMMERCIAL

## 2024-08-10 DIAGNOSIS — C61 PROSTATE CANCER (HCC): ICD-10-CM

## 2024-08-10 LAB — PSA SERPL-MCNC: 3.39 NG/ML (ref ?–4)

## 2024-08-10 PROCEDURE — 84153 ASSAY OF PSA TOTAL: CPT

## 2024-08-10 PROCEDURE — 36415 COLL VENOUS BLD VENIPUNCTURE: CPT

## 2024-08-12 ENCOUNTER — OFFICE VISIT (OUTPATIENT)
Dept: SURGERY | Facility: CLINIC | Age: 64
End: 2024-08-12
Payer: COMMERCIAL

## 2024-08-12 DIAGNOSIS — C61 PROSTATE CANCER (HCC): Primary | ICD-10-CM

## 2024-08-12 DIAGNOSIS — R35.0 BENIGN PROSTATIC HYPERPLASIA WITH URINARY FREQUENCY: ICD-10-CM

## 2024-08-12 DIAGNOSIS — N40.1 BENIGN PROSTATIC HYPERPLASIA WITH URINARY FREQUENCY: ICD-10-CM

## 2024-08-12 PROCEDURE — 99214 OFFICE O/P EST MOD 30 MIN: CPT | Performed by: UROLOGY

## 2024-08-12 PROCEDURE — 51798 US URINE CAPACITY MEASURE: CPT | Performed by: UROLOGY

## 2024-08-12 RX ORDER — TAMSULOSIN HYDROCHLORIDE 0.4 MG/1
0.4 CAPSULE ORAL DAILY
Qty: 90 CAPSULE | Refills: 3 | Status: SHIPPED | OUTPATIENT
Start: 2024-08-12 | End: 2025-08-07

## 2024-08-12 NOTE — PROGRESS NOTES
Jose Gonzalez is a 64 year old male.    HPI:     Chief Complaint   Patient presents with    Follow - Up     Pt is here for f/u pt completed psa prior to today. Pt states that medication tadalafil is not working for him. States unable to fully empty out bladder. Completed pvr due to symptoms, 50ml        64-year-old male in follow-up to a previous visit 2024.  Has a history of prostate cancer, low risk clinical T1c [place] 3+3 PSA 7.8 diagnosed on prostate biopsy 2020 for which he remains on active surveillance.  He had been diagnosed by Dr. Spencer.  Prostate MRI in  at [facility] demonstrated PI-RADS 4 classification.  Repeat MRI 2022 demonstrated no change.  He has refused consideration for a follow-up prostate biopsy.  He has BPH, lower urinary tract symptoms and erectile dysfunction that had initially been responsive to tadalafil 5 mg daily.  He states now that he is noticing urgency and rare urge associated incontinence.  He continues to report good control of his erectile dysfunction with tadalafil.  His PSA 2023 was 3.29.  Repeat PSA August 10, 2024 3.39.  HISTORY:  Past Medical History:    Numbness and tingling    Osteoarthritis      Past Surgical History:   Procedure Laterality Date    Colonoscopy      Knee surgery Left 3-    left knee arthroscopy      Family History   Problem Relation Age of Onset    Cancer Father         bladder    Diabetes Father     Diabetes Mother     Cancer Son         kidney tumor      Social History:   Social History     Socioeconomic History    Marital status:    Tobacco Use    Smoking status: Former     Current packs/day: 0.00     Types: Cigarettes     Start date: 3/1/1977     Quit date: 3/1/1997     Years since quittin.4    Smokeless tobacco: Never   Substance and Sexual Activity    Alcohol use: Yes     Alcohol/week: 4.0 standard drinks of alcohol     Types: 4 Cans of beer per week    Drug  use: No   Other Topics Concern    Caffeine Concern Yes     Comment: coffee 6 cups daily    Exercise No    Seat Belt No    Special Diet No    Stress Concern No    Weight Concern No        Medications (Active prior to today's visit):  Current Outpatient Medications   Medication Sig Dispense Refill    tamsulosin 0.4 MG Oral Cap Take 1 capsule (0.4 mg total) by mouth daily. Take 1/2 hour following the same meal each day 90 capsule 3    ATORVASTATIN 40 MG Oral Tab TAKE ONE TABLET BY MOUTH NIGHTLY 90 tablet 0    DULOXETINE 60 MG Oral Cap DR Particles TAKE ONE CAPSULE BY MOUTH ONE TIME DAILY 90 capsule 0    MELOXICAM 15 MG Oral Tab TAKE ONE TABLET BY MOUTH ONE TIME DAILY WITH FOOD 90 tablet 0    levothyroxine 100 MCG Oral Tab Take 1 tablet by mouth daily in the morning before breakfast 90 tablet 1    tadalafil 5 MG Oral Tab Take 1 tablet (5 mg total) by mouth daily as needed for Erectile Dysfunction. 90 tablet 3    Multiple Vitamins-Minerals (ONE-A-DAY MENS 50+ ADVANTAGE) Oral Tab Take 1 tablet by mouth.         Allergies:  No Known Allergies      ROS:       PHYSICAL EXAM:   Digital prostate exam demonstrates a normal sphincter tone, prostate which is about 45 g smooth symmetric without masses or nodules.  Bladder scan for postvoid residual volume 50 mL  ASSESSMENT/PLAN:   Assessment   Encounter Diagnoses   Name Primary?    Prostate cancer (HCC) Yes    Benign prostatic hyperplasia with urinary frequency        Recommend:  - Will start the patient empirically on tamsulosin 0.4 mg daily.  Side effects reviewed.  - He may continue to use tadalafil 5 mg as needed for erectile dysfunction.  - Return to clinic otherwise in 6 months with a repeat PSA prior to the appointment.  I again stressed the importance of a repeat prostate biopsy as part of his active surveillance protocol.  He understands the limitations and accurately monitoring the status of his prostate cancer if a repeat biopsy is not performed.  He will consider this  and will call us back to schedule if he decides to proceed with it.  Otherwise he will return to clinic in 6 months.    I spent a total of 25 minutes with patient more than half time face-to-face discussion.         Orders This Visit:  Orders Placed This Encounter   Procedures    PSA Diagnostic [E]    PSA Diagnostic [E]       Meds This Visit:  Requested Prescriptions     Signed Prescriptions Disp Refills    tamsulosin 0.4 MG Oral Cap 90 capsule 3     Sig: Take 1 capsule (0.4 mg total) by mouth daily. Take 1/2 hour following the same meal each day       Imaging & Referrals:  None     8/12/2024  Paulo Correa MD

## 2024-08-17 ENCOUNTER — LAB ENCOUNTER (OUTPATIENT)
Dept: LAB | Age: 64
End: 2024-08-17
Attending: INTERNAL MEDICINE
Payer: COMMERCIAL

## 2024-08-17 DIAGNOSIS — I10 HYPERTENSION: Primary | ICD-10-CM

## 2024-08-17 DIAGNOSIS — I25.10 CORONARY ATHEROSCLEROSIS OF NATIVE CORONARY ARTERY: ICD-10-CM

## 2024-08-17 DIAGNOSIS — E78.5 HYPERLIPEMIA: ICD-10-CM

## 2024-08-17 LAB
ALBUMIN SERPL-MCNC: 4.3 G/DL (ref 3.2–4.8)
ALBUMIN/GLOB SERPL: 1.6 {RATIO} (ref 1–2)
ALP LIVER SERPL-CCNC: 83 U/L
ALT SERPL-CCNC: 25 U/L
ANION GAP SERPL CALC-SCNC: 5 MMOL/L (ref 0–18)
AST SERPL-CCNC: 26 U/L (ref ?–34)
BASOPHILS # BLD AUTO: 0.04 X10(3) UL (ref 0–0.2)
BASOPHILS NFR BLD AUTO: 0.8 %
BILIRUB SERPL-MCNC: 0.5 MG/DL (ref 0.2–1.1)
BUN BLD-MCNC: 18 MG/DL (ref 9–23)
BUN/CREAT SERPL: 16.5 (ref 10–20)
CALCIUM BLD-MCNC: 9.6 MG/DL (ref 8.7–10.4)
CHLORIDE SERPL-SCNC: 111 MMOL/L (ref 98–112)
CHOLEST SERPL-MCNC: 167 MG/DL (ref ?–200)
CO2 SERPL-SCNC: 26 MMOL/L (ref 21–32)
CREAT BLD-MCNC: 1.09 MG/DL
DEPRECATED RDW RBC AUTO: 39.6 FL (ref 35.1–46.3)
EGFRCR SERPLBLD CKD-EPI 2021: 76 ML/MIN/1.73M2 (ref 60–?)
EOSINOPHIL # BLD AUTO: 0.17 X10(3) UL (ref 0–0.7)
EOSINOPHIL NFR BLD AUTO: 3.4 %
ERYTHROCYTE [DISTWIDTH] IN BLOOD BY AUTOMATED COUNT: 12.9 % (ref 11–15)
FASTING PATIENT LIPID ANSWER: YES
FASTING STATUS PATIENT QL REPORTED: YES
GLOBULIN PLAS-MCNC: 2.7 G/DL (ref 2–3.5)
GLUCOSE BLD-MCNC: 117 MG/DL (ref 70–99)
HCT VFR BLD AUTO: 45 %
HDLC SERPL-MCNC: 51 MG/DL (ref 40–59)
HGB BLD-MCNC: 14.9 G/DL
IMM GRANULOCYTES # BLD AUTO: 0.01 X10(3) UL (ref 0–1)
IMM GRANULOCYTES NFR BLD: 0.2 %
LDLC SERPL CALC-MCNC: 104 MG/DL (ref ?–100)
LYMPHOCYTES # BLD AUTO: 1.37 X10(3) UL (ref 1–4)
LYMPHOCYTES NFR BLD AUTO: 27.4 %
MCH RBC QN AUTO: 28 PG (ref 26–34)
MCHC RBC AUTO-ENTMCNC: 33.1 G/DL (ref 31–37)
MCV RBC AUTO: 84.6 FL
MONOCYTES # BLD AUTO: 0.44 X10(3) UL (ref 0.1–1)
MONOCYTES NFR BLD AUTO: 8.8 %
NEUTROPHILS # BLD AUTO: 2.97 X10 (3) UL (ref 1.5–7.7)
NEUTROPHILS # BLD AUTO: 2.97 X10(3) UL (ref 1.5–7.7)
NEUTROPHILS NFR BLD AUTO: 59.4 %
NONHDLC SERPL-MCNC: 116 MG/DL (ref ?–130)
OSMOLALITY SERPL CALC.SUM OF ELEC: 297 MOSM/KG (ref 275–295)
PLATELET # BLD AUTO: 229 10(3)UL (ref 150–450)
POTASSIUM SERPL-SCNC: 4.2 MMOL/L (ref 3.5–5.1)
PROT SERPL-MCNC: 7 G/DL (ref 5.7–8.2)
RBC # BLD AUTO: 5.32 X10(6)UL
SODIUM SERPL-SCNC: 142 MMOL/L (ref 136–145)
TRIGL SERPL-MCNC: 64 MG/DL (ref 30–149)
VLDLC SERPL CALC-MCNC: 11 MG/DL (ref 0–30)
WBC # BLD AUTO: 5 X10(3) UL (ref 4–11)

## 2024-08-17 PROCEDURE — 85025 COMPLETE CBC W/AUTO DIFF WBC: CPT

## 2024-08-17 PROCEDURE — 80061 LIPID PANEL: CPT

## 2024-08-17 PROCEDURE — 80053 COMPREHEN METABOLIC PANEL: CPT

## 2024-08-17 PROCEDURE — 36415 COLL VENOUS BLD VENIPUNCTURE: CPT

## 2024-12-13 PROCEDURE — 82306 VITAMIN D 25 HYDROXY: CPT | Performed by: NURSE PRACTITIONER

## 2024-12-13 PROCEDURE — 84439 ASSAY OF FREE THYROXINE: CPT | Performed by: NURSE PRACTITIONER

## 2024-12-13 PROCEDURE — 85025 COMPLETE CBC W/AUTO DIFF WBC: CPT | Performed by: NURSE PRACTITIONER

## 2024-12-13 PROCEDURE — 80053 COMPREHEN METABOLIC PANEL: CPT | Performed by: NURSE PRACTITIONER

## 2024-12-13 PROCEDURE — 84443 ASSAY THYROID STIM HORMONE: CPT | Performed by: NURSE PRACTITIONER

## 2024-12-13 PROCEDURE — 84481 FREE ASSAY (FT-3): CPT | Performed by: NURSE PRACTITIONER

## 2024-12-13 PROCEDURE — 80061 LIPID PANEL: CPT | Performed by: NURSE PRACTITIONER

## 2025-02-08 ENCOUNTER — LAB ENCOUNTER (OUTPATIENT)
Dept: LAB | Age: 65
End: 2025-02-08
Attending: UROLOGY
Payer: COMMERCIAL

## 2025-02-08 DIAGNOSIS — C61 PROSTATE CANCER (HCC): ICD-10-CM

## 2025-02-08 DIAGNOSIS — I50.32 CHRONIC DIASTOLIC HEART FAILURE (HCC): ICD-10-CM

## 2025-02-08 DIAGNOSIS — I25.10 CVD (CARDIOVASCULAR DISEASE): Primary | ICD-10-CM

## 2025-02-08 DIAGNOSIS — E78.5 HYPERLIPEMIA: ICD-10-CM

## 2025-02-08 LAB
ANION GAP SERPL CALC-SCNC: 9 MMOL/L (ref 0–18)
BASOPHILS # BLD AUTO: 0.04 X10(3) UL (ref 0–0.2)
BASOPHILS NFR BLD AUTO: 0.9 %
BUN BLD-MCNC: 15 MG/DL (ref 9–23)
BUN/CREAT SERPL: 14.6 (ref 10–20)
CALCIUM BLD-MCNC: 9.2 MG/DL (ref 8.7–10.4)
CHLORIDE SERPL-SCNC: 109 MMOL/L (ref 98–112)
CHOLEST SERPL-MCNC: 151 MG/DL (ref ?–200)
CO2 SERPL-SCNC: 25 MMOL/L (ref 21–32)
CREAT BLD-MCNC: 1.03 MG/DL
DEPRECATED RDW RBC AUTO: 41.1 FL (ref 35.1–46.3)
EGFRCR SERPLBLD CKD-EPI 2021: 81 ML/MIN/1.73M2 (ref 60–?)
EOSINOPHIL # BLD AUTO: 0.19 X10(3) UL (ref 0–0.7)
EOSINOPHIL NFR BLD AUTO: 4.4 %
ERYTHROCYTE [DISTWIDTH] IN BLOOD BY AUTOMATED COUNT: 13.4 % (ref 11–15)
FASTING PATIENT LIPID ANSWER: YES
FASTING STATUS PATIENT QL REPORTED: YES
GLUCOSE BLD-MCNC: 107 MG/DL (ref 70–99)
HCT VFR BLD AUTO: 43.6 %
HDLC SERPL-MCNC: 44 MG/DL (ref 40–59)
HGB BLD-MCNC: 14.3 G/DL
IMM GRANULOCYTES # BLD AUTO: 0.01 X10(3) UL (ref 0–1)
IMM GRANULOCYTES NFR BLD: 0.2 %
LDLC SERPL CALC-MCNC: 91 MG/DL (ref ?–100)
LYMPHOCYTES # BLD AUTO: 1.3 X10(3) UL (ref 1–4)
LYMPHOCYTES NFR BLD AUTO: 30.2 %
MCH RBC QN AUTO: 27.4 PG (ref 26–34)
MCHC RBC AUTO-ENTMCNC: 32.8 G/DL (ref 31–37)
MCV RBC AUTO: 83.5 FL
MONOCYTES # BLD AUTO: 0.38 X10(3) UL (ref 0.1–1)
MONOCYTES NFR BLD AUTO: 8.8 %
NEUTROPHILS # BLD AUTO: 2.39 X10 (3) UL (ref 1.5–7.7)
NEUTROPHILS # BLD AUTO: 2.39 X10(3) UL (ref 1.5–7.7)
NEUTROPHILS NFR BLD AUTO: 55.5 %
NONHDLC SERPL-MCNC: 107 MG/DL (ref ?–130)
OSMOLALITY SERPL CALC.SUM OF ELEC: 297 MOSM/KG (ref 275–295)
PLATELET # BLD AUTO: 215 10(3)UL (ref 150–450)
POTASSIUM SERPL-SCNC: 4.1 MMOL/L (ref 3.5–5.1)
PSA SERPL-MCNC: 3.58 NG/ML (ref ?–4)
RBC # BLD AUTO: 5.22 X10(6)UL
SODIUM SERPL-SCNC: 143 MMOL/L (ref 136–145)
TRIGL SERPL-MCNC: 82 MG/DL (ref 30–149)
VLDLC SERPL CALC-MCNC: 13 MG/DL (ref 0–30)
WBC # BLD AUTO: 4.3 X10(3) UL (ref 4–11)

## 2025-02-08 PROCEDURE — 80048 BASIC METABOLIC PNL TOTAL CA: CPT

## 2025-02-08 PROCEDURE — 80061 LIPID PANEL: CPT

## 2025-02-08 PROCEDURE — 36415 COLL VENOUS BLD VENIPUNCTURE: CPT

## 2025-02-08 PROCEDURE — 84153 ASSAY OF PSA TOTAL: CPT

## 2025-02-08 PROCEDURE — 85025 COMPLETE CBC W/AUTO DIFF WBC: CPT

## 2025-02-14 ENCOUNTER — OFFICE VISIT (OUTPATIENT)
Dept: SURGERY | Facility: CLINIC | Age: 65
End: 2025-02-14
Payer: COMMERCIAL

## 2025-02-14 DIAGNOSIS — C61 PROSTATE CANCER (HCC): Primary | ICD-10-CM

## 2025-02-14 DIAGNOSIS — N40.1 BENIGN PROSTATIC HYPERPLASIA WITH URINARY FREQUENCY: ICD-10-CM

## 2025-02-14 DIAGNOSIS — R35.0 BENIGN PROSTATIC HYPERPLASIA WITH URINARY FREQUENCY: ICD-10-CM

## 2025-02-14 PROCEDURE — 99214 OFFICE O/P EST MOD 30 MIN: CPT | Performed by: UROLOGY

## 2025-02-14 NOTE — PROGRESS NOTES
Jose Gonzalez is a 64 year old male.    HPI:     Chief Complaint   Patient presents with    Follow - Up     6 month f/u, discuss psa results, pt has been taking flomax feels symptoms have improved.       64-year-old male in follow-up to a previous visit 2024.  Has a history of prostate cancer low risk clinical T1c [place] 3+3 initial PSA 7.8 diagnosed on biopsy 2020 on active surveillance.  He had been previously following with Dr. Spencer.    Prostate MRI in  at [facility] demonstrated PI-RADS 4 classification.  Repeat MRI 2022 demonstrated no change.  He has refused consideration for repeat prostate biopsy as per active surveillance protocol.  Continues to report BPH and lower urinary tract symptoms although improved since I started him at his last visit in August on tamsulosin.  IPSS score today 10 quality-of-life index is 3.  He also continues to use tadalafil 5 mg as needed for erectile dysfunction.  PSA 2025 continues to be stable at 3.58.  He denies any bone pain or weight loss.  Denies any gross hematuria or dysuria.    HISTORY:  Past Medical History:    Numbness and tingling    Osteoarthritis      Past Surgical History:   Procedure Laterality Date    Colonoscopy      Knee surgery Left 3-    left knee arthroscopy      Family History   Problem Relation Age of Onset    Cancer Father         bladder    Diabetes Father     Diabetes Mother     Cancer Son         kidney tumor      Social History:   Social History     Socioeconomic History    Marital status:    Tobacco Use    Smoking status: Former     Current packs/day: 0.00     Types: Cigarettes     Start date: 3/1/1977     Quit date: 3/1/1997     Years since quittin.9    Smokeless tobacco: Never   Substance and Sexual Activity    Alcohol use: Yes     Alcohol/week: 4.0 standard drinks of alcohol     Types: 4 Cans of beer per week    Drug use: No   Other Topics Concern     Caffeine Concern Yes     Comment: coffee 6 cups daily    Exercise No    Seat Belt No    Special Diet No    Stress Concern No    Weight Concern No        Medications (Active prior to today's visit):  Current Outpatient Medications   Medication Sig Dispense Refill    MELOXICAM 15 MG Oral Tab TAKE ONE TABLET BY MOUTH ONE TIME DAILY WITH FOOD 90 tablet 0    DULOXETINE 60 MG Oral Cap DR Particles TAKE ONE CAPSULE BY MOUTH DAILY. 90 capsule 0    levothyroxine 100 MCG Oral Tab Take 1 tablet (100 mcg total) by mouth before breakfast. 90 tablet 1    rosuvastatin 5 MG Oral Tab Take 1 tablet (5 mg total) by mouth nightly. 90 tablet 0    tamsulosin 0.4 MG Oral Cap Take 1 capsule (0.4 mg total) by mouth daily. Take 1/2 hour following the same meal each day 90 capsule 3    tadalafil 5 MG Oral Tab Take 1 tablet (5 mg total) by mouth daily as needed for Erectile Dysfunction. 90 tablet 3    Multiple Vitamins-Minerals (ONE-A-DAY MENS 50+ ADVANTAGE) Oral Tab Take 1 tablet by mouth.         Allergies:  Allergies[1]      ROS:       PHYSICAL EXAM:        ASSESSMENT/PLAN:   Assessment   Encounter Diagnoses   Name Primary?    Prostate cancer (HCC) Yes    Benign prostatic hyperplasia with urinary frequency      Reviewed findings at length with patient.  Discussed options for management.  He and I agreed to repeat his MRI in anticipation of a repeat MRI ultrasound fusion biopsy depending on results.  He understands the importance of follow-up as discussed and will follow-up accordingly.             Orders This Visit:  No orders of the defined types were placed in this encounter.      Meds This Visit:  Requested Prescriptions      No prescriptions requested or ordered in this encounter       Imaging & Referrals:  MRI PROSTATE (W+WO)(CPT=72197)     2/14/2025  Paulo Correa MD               [1] No Known Allergies

## 2025-04-12 ENCOUNTER — HOSPITAL ENCOUNTER (OUTPATIENT)
Dept: MRI IMAGING | Facility: HOSPITAL | Age: 65
Discharge: HOME | End: 2025-04-12
Attending: UROLOGY
Payer: COMMERCIAL

## 2025-04-12 DIAGNOSIS — C61 PROSTATE CANCER (HCC): ICD-10-CM

## 2025-04-12 PROCEDURE — A9575 INJ GADOTERATE MEGLUMI 0.1ML: HCPCS | Performed by: UROLOGY

## 2025-04-12 PROCEDURE — 72197 MRI PELVIS W/O & W/DYE: CPT | Performed by: UROLOGY

## 2025-04-12 RX ORDER — GADOTERATE MEGLUMINE 376.9 MG/ML
20 INJECTION INTRAVENOUS
Status: COMPLETED | OUTPATIENT
Start: 2025-04-12 | End: 2025-04-12

## 2025-04-12 RX ADMIN — GADOTERATE MEGLUMINE 20 ML: 376.9 INJECTION INTRAVENOUS at 15:14:00

## 2025-06-02 ENCOUNTER — TELEPHONE (OUTPATIENT)
Dept: SURGERY | Facility: CLINIC | Age: 65
End: 2025-06-02

## 2025-06-02 NOTE — TELEPHONE ENCOUNTER
Per patient requesting call back regarding setting up his follow up appointment with Dr. Correa. Please call back.

## 2025-06-02 NOTE — TELEPHONE ENCOUNTER
Left message to call back.     HEYDI's if patient calls back, [facility] [phone].

## 2025-06-02 NOTE — TELEPHONE ENCOUNTER
Spoke with patient, assisted in scheduling follow up visit to discuss. Copy of  previous result TE below.     Future Appointments   Date Time Provider Department Center   6/10/2025  4:10 PM Paulo Correa MD [facility]         Paulo Correa MD to Jose Gonzalez        5/13/25  5:09 PM  Dear Jose,  I reviewed your recent prostate MRI.  This continues to show areas of suspicious lesions within the prostate.  Some of the previously seen areas of suspicion have resolved but there is a new lesion in the left part of the prostate measuring 1 cm in size.  Please make sure you have an appointment to see me in the office to discuss the need for repeat prostate biopsy.  If an appointment has not yet been made please call the office and make an appointment in the next 3 to 4 weeks to discuss.    Last read by Jose Gonzalez at 9:41AM on 6/2/2025.  MRI PROSTATE (W+WO)(CPT=72197)

## 2025-06-10 ENCOUNTER — TELEPHONE (OUTPATIENT)
Dept: SURGERY | Facility: CLINIC | Age: 65
End: 2025-06-10

## 2025-06-10 ENCOUNTER — OFFICE VISIT (OUTPATIENT)
Dept: SURGERY | Facility: CLINIC | Age: 65
End: 2025-06-10
Payer: COMMERCIAL

## 2025-06-10 DIAGNOSIS — R35.0 BENIGN PROSTATIC HYPERPLASIA WITH URINARY FREQUENCY: ICD-10-CM

## 2025-06-10 DIAGNOSIS — N40.1 BENIGN PROSTATIC HYPERPLASIA WITH URINARY FREQUENCY: ICD-10-CM

## 2025-06-10 DIAGNOSIS — R97.20 ELEVATED PSA: Primary | ICD-10-CM

## 2025-06-10 DIAGNOSIS — C61 PROSTATE CANCER (HCC): Primary | ICD-10-CM

## 2025-06-10 PROCEDURE — 99214 OFFICE O/P EST MOD 30 MIN: CPT | Performed by: UROLOGY

## 2025-06-10 NOTE — TELEPHONE ENCOUNTER
Pt was seen today and would like to go ahead and schedule the Bx that dr had suggested .. Please homar .

## 2025-06-11 NOTE — PROGRESS NOTES
Jose Gonzalez is a 65 year old male.    HPI:     Chief Complaint   Patient presents with    Follow - Up     Patient is here to discuss MRI results       65-year-old male in follow-up to previous visit February 14, 2025 with a history of prostate cancer low risk clinical T1c [place] 3+3 initial PSA 7.8 diagnosed on biopsy September 2020 who has been on active surveillance.  Previously followed by Dr. Spencer.  Had a prostate MRI in 2020 [facility] showing PI-RADS 4 classification.  Repeat MRI April 2022 demonstrated no change.  He has refused consideration for repeat prostate biopsies as per active surveillance protocol during our discussions.  Continues to report moderate to significant lower urinary tract symptoms with an IPSS score today of 17 quality-of-life index score of 2.  Continues to use tadalafil 5 mg as needed mostly for erectile dysfunction.  PSA has remained stable most recently at 3.5 8 February 2025.  No bone pain or weight loss.  I referred the patient for repeat prostate MRI performed April 12, 2025 showing essentially stable findings with a 1.1 cm left anterolateral peripheral zone lesion near the apex classified as PI-RADS 4.  There are stable to slightly decrease lesions in the left posterior medial peripheral zone and mid gland apex PI-RADS 3 classification.  The previously noted lesion in the right posterior medial peripheral zone is no longer seen.  No enlarged lymph nodes.  No evidence of osseous metastasis.      HISTORY:  Past Medical History[1]   Past Surgical History[2]   Family History[3]   Social History: Short Social Hx on File[4]     Medications (Active prior to today's visit):  Current Medications[5]    Allergies:  Allergies[6]      ROS:       PHYSICAL EXAM:        ASSESSMENT/PLAN:   Assessment   Encounter Diagnoses   Name Primary?    Prostate cancer (HCC) Yes    Benign prostatic hyperplasia with urinary frequency        Reviewed findings at length with  patient.  I again recommended proceeding with a repeat prostate biopsy, UroNav using the information from his recent MRI.  He would like to consider this stating that he is very busy over the summer and may call back to schedule in July or August.  I told the patient that I will be leaving this practice at the end of the month and he will call back the office once he is ready to proceed with UroNav biopsy in the office as discussed.  The importance of follow-up was reviewed with the patient.  If he decides to defer repeat prostate biopsy he will return to clinic for an office visit in 3 months with a PSA at that time.  An order has been entered into his electronic medical chart.         Orders This Visit:  No orders of the defined types were placed in this encounter.      Meds This Visit:  Requested Prescriptions      No prescriptions requested or ordered in this encounter       Imaging & Referrals:  None     2025  Paulo Correa MD               [1]   Past Medical History:   Numbness and tingling    Osteoarthritis   [2]   Past Surgical History:  Procedure Laterality Date    Colonoscopy      Knee surgery Left 3-    left knee arthroscopy   [3]   Family History  Problem Relation Age of Onset    Cancer Father         bladder    Diabetes Father     Diabetes Mother     Cancer Son         kidney tumor   [4]   Social History  Socioeconomic History    Marital status:    Tobacco Use    Smoking status: Former     Current packs/day: 0.00     Types: Cigarettes     Start date: 3/1/1977     Quit date: 3/1/1997     Years since quittin.2    Smokeless tobacco: Never   Substance and Sexual Activity    Alcohol use: Yes     Alcohol/week: 4.0 standard drinks of alcohol     Types: 4 Cans of beer per week    Drug use: No   Other Topics Concern    Caffeine Concern Yes     Comment: coffee 6 cups daily    Exercise No    Seat Belt No    Special Diet No    Stress Concern No    Weight Concern No   [5]   Current  Outpatient Medications   Medication Sig Dispense Refill    MELOXICAM 15 MG Oral Tab TAKE ONE TABLET BY MOUTH ONE TIME DAILY WITH FOOD 90 tablet 0    DULOXETINE 60 MG Oral Cap DR Particles TAKE ONE CAPSULE BY MOUTH DAILY. 90 capsule 0    ROSUVASTATIN 5 MG Oral Tab TAKE ONE TABLET BY MOUTH NIGHTLY 90 tablet 0    levothyroxine 100 MCG Oral Tab Take 1 tablet (100 mcg total) by mouth before breakfast. 90 tablet 1    tamsulosin 0.4 MG Oral Cap Take 1 capsule (0.4 mg total) by mouth daily. Take 1/2 hour following the same meal each day 90 capsule 3    tadalafil 5 MG Oral Tab Take 1 tablet (5 mg total) by mouth daily as needed for Erectile Dysfunction. 90 tablet 3    Multiple Vitamins-Minerals (ONE-A-DAY MENS 50+ ADVANTAGE) Oral Tab Take 1 tablet by mouth.     [6] No Known Allergies

## 2025-06-13 RX ORDER — CIPROFLOXACIN 500 MG/1
500 TABLET, FILM COATED ORAL 2 TIMES DAILY
Qty: 6 TABLET | Refills: 0 | Status: SHIPPED | OUTPATIENT
Start: 2025-06-13

## 2025-06-13 RX ORDER — CEFDINIR 300 MG/1
300 CAPSULE ORAL EVERY 12 HOURS
Qty: 6 CAPSULE | Refills: 0 | Status: SHIPPED | OUTPATIENT
Start: 2025-06-13 | End: 2025-06-16

## 2025-06-17 NOTE — TELEPHONE ENCOUNTER
Spoke with pt, scheduled UroNav prostate biopsy for July 14, 2025  -pt is aware of Dr Coe' departure from practice and verbally agreed to have procedure with Dr. Lubin.     Procedure instructions sent to pt thru portal and is aware to complete Urine culture 10 days prior to surgery.  Per pt, will  antibiotics sent by Dr Correa to pts' pharmacy.

## 2025-06-17 NOTE — TELEPHONE ENCOUNTER
Patient states that he is still waiting to get a call back from the nurse to schedule his procedure.

## 2025-07-05 ENCOUNTER — LAB ENCOUNTER (OUTPATIENT)
Dept: LAB | Age: 65
End: 2025-07-05
Attending: UROLOGY
Payer: COMMERCIAL

## 2025-07-05 DIAGNOSIS — R97.20 ELEVATED PSA: ICD-10-CM

## 2025-07-05 PROCEDURE — 87086 URINE CULTURE/COLONY COUNT: CPT

## 2025-07-14 ENCOUNTER — PROCEDURE (OUTPATIENT)
Dept: SURGERY | Facility: CLINIC | Age: 65
End: 2025-07-14
Payer: COMMERCIAL

## 2025-07-14 VITALS — DIASTOLIC BLOOD PRESSURE: 66 MMHG | HEART RATE: 55 BPM | SYSTOLIC BLOOD PRESSURE: 111 MMHG

## 2025-07-14 DIAGNOSIS — C61 PROSTATE CANCER (HCC): Primary | ICD-10-CM

## 2025-07-14 NOTE — PROCEDURES
TRANSRECTAL ULTRASOUND/PROSTATE NEEDLE BIOPSY/URONAV     PRE-OP DIAGNOSIS: Elevated PSA     POST-OP DIAGNOSIS: Same     PROCEDURE:  1. Transrectal ultrasound of the prostate MRI GUIDANCE  2. Periprostatic nerve block  3. Ultrasound guided needle placement  4. 12 Core-prostate needle biopsy + MRI GUIDED BIOPSIES     SURGEON: Lor Lubin MD     ASSISTANT: none     EBL: minimal     JASON:  No nodules     ULTRASOUND FINDINGS:   1.  3.57 H, 4.56 W, 4.7 to L, volume of 40.32 cc.      INDICATIONS: elevated PSA. PIRADS 4 lesion     PROCEDURE: Patient was brought to the procedure suite and an time-out was performed identifiying the patient,  and procedure being performed. The risks of the procedure were once again detailed to patient including bleeding (hematospermia, hematochezia and hematuria), infection, sepsis, temporary erectile dysfunction, pelvic pain and possible death from infectious complications. The patient agreed to proceed. The patient did have a negative urinalysis and took antibiotics 1 day prior to the procedure and 1 hour prior to the biopsy. He will take one more day of antibiotics tomorrow to complete his antibiotic course.      He was placed in a LEFT flank position and a JASON was performed revealing a  gland with no nodules or firmness. The gloved finger was removed and the end-fire prostate biopsy ultrasound probe was introduced per anus into the rectum. 0.5% marcaine without epinephrine was injection into the periprostatic nerve bundle, 5cc on each side using ultrasound guidance. The prostate was measured sagittaly and transversely and no hypoechoic nodules/areas were seen. The prostate was measured with dimensions of  3.57 H, 4.56 W, 4.7 to L, volume of 40.32 cc.        MRI was used to correlate with the ultrasound guided images. 5 biopsies were taken from the clinically significant lesions.     Using ultrasound guidance, biopsies were taken from the lateral and medial aspects of the base,  middle and apex of each lobe. The total number of biopsies was 6 cores from each lobe, 12 in total. These were labeled and sent to pathology for specimen analysis.     The probe was removed from the rectum and a finger was placed in the rectum to hold pressure on the prostate for several minutes. There was no active bleeding after removal of the finger. There were no complications after this procedure and the patient tolerated the biopsy without issue.     He will follow up in 1-2 weeks to review pathology and determine best management course.    17 biopsies

## 2025-07-23 ENCOUNTER — OFFICE VISIT (OUTPATIENT)
Dept: SURGERY | Facility: CLINIC | Age: 65
End: 2025-07-23
Payer: COMMERCIAL

## 2025-07-23 DIAGNOSIS — C61 PROSTATE CANCER (HCC): Primary | ICD-10-CM

## 2025-07-23 PROCEDURE — 99214 OFFICE O/P EST MOD 30 MIN: CPT | Performed by: UROLOGY

## 2025-07-23 PROCEDURE — G2211 COMPLEX E/M VISIT ADD ON: HCPCS | Performed by: UROLOGY

## 2025-07-23 NOTE — PROGRESS NOTES
Lor Lubin MD  Department of Urology  [address]  [place] [address]    T: [phone]  F: [phone]    To: LORY SAMPSON MD   [address]  [place] [address]    Re: Jose Gonzalez   MRN: [id number]  : 1960    Dear LORY SAMPSON MD,    Today I had the pleasure of seeing Jose Gonzalez in my clinic. As you know, Mr. Gonzalez is a pleasant 65 year old year old male who I am seeing for elevated PSA. Patient was last seen in this department on 6/10/2025.    Briefly, patient has seen by my partner Dr. Correa in the past.  He has a history of 3+3 prostate cancer diagnosed on biopsy in  who is an active surveillance.  He did have a prostate MRI in  and then in  that demonstrated a PI-RADS 4 lesion.  He has refused repeat biopsies in the past.  He had a repeat MRI in 2025 showing a PI-RADS 4 lesion.  He had recommended proceeding with a prostate biopsy/UroNav.  Patient declined and stated he was very busy and wanted to return once his schedule improved.  Please note that he is not on dutasteride or finasteride.  His most recent PSA is within normal limits.      His most recent PSA in 2025 is 3.58.  His MRI prostate on 2025 demonstrated a new lesion in the left anterior zone of the apex measuring 1.1 cm a PI-RADS 4 classification.  He also has a PI-RADS 3 lesion.  There were no aggressive osseous lesions or lymph nodes.    Final Diagnosis:         A. Prostate, right lateral base; needle biopsy:   Prostatic tissue with mild chronic inflammation.  No evidence of malignancy identified.     B. Prostate, right medial base; needle biopsy:   Benign prostatic tissue.     C. Prostate, right lateral mid; needle biopsy:   Prostatic tissue with mild acute and chronic inflammation.  No evidence of malignancy identified.     D. Prostate, right medial mid; needle biopsy:   Prostatic tissue with chronic inflammation.  No evidence of malignancy identified.        E. Prostate, right lateral apex; needle biopsy:   Prostatic tissue with chronic inflammation.  No evidence of malignancy identified.        F. Prostate, right medial apex; needle biopsy:   Prostatic tissue with mild acute and chronic inflammation.  No evidence of malignancy identified.      G. Prostate, left lateral base; needle biopsy:   Benign prostatic tissue     H. Prostate, left medial base: needle biopsy:   Prostatic tissue with focal mild chronic inflammation.  No evidence of malignancy identified.     I. Prostate, left lateral mid; needle biopsy:   Prostatic tissue with focal mild chronic inflammation.  No evidence of malignancy identified.     J. Prostate, left medial mid; needle biopsy:   Prostatic tissue with focal mild chronic inflammation.  No evidence of malignancy identified.     K. Prostate, left lateral apex; needle biopsy:   Prostatic tissue with focal mild chronic inflammation.  No evidence of malignancy identified.       L. Prostate, left medial apex; needle biopsy:   Prostatic tissue with chronic inflammation.  No evidence of malignancy identified.     M. Prostate, region of interest #1; needle biopsy:  Prostatic tissue with focal mild chronic inflammation.  No evidence of malignancy identified.     N. Prostate, region of interest #2; needle biopsy:   Prostatic tissue with mild chronic inflammation.  No evidence of malignancy identified.            PAST MEDICAL HISTORY:  Past Medical History[1]     PAST SURGICAL HISTORY:  Past Surgical History[2]      ALLERGIES:  Allergies[3]      MEDICATIONS:  Current Outpatient Medications   Medication Instructions    ciprofloxacin (CIPRO) 500 mg, Oral, 2 times daily    DULoxetine (CYMBALTA) 60 mg, Oral, Daily    levothyroxine (SYNTHROID) 100 mcg, Oral, Before breakfast    Meloxicam 15 mg, Oral, Daily with food    Multiple Vitamins-Minerals (ONE-A-DAY MENS 50+ ADVANTAGE) Oral Tab 1 tablet    rosuvastatin (CRESTOR) 5 mg, Oral, Nightly    tadalafil  (CIALIS) 5 mg, Oral, Daily as needed    tamsulosin (FLOMAX) 0.4 mg, Oral, Daily, Take 1/2 hour following the same meal each day        FAMILY HISTORY:  Family History[4]     SOCIAL HISTORY:  Short Social Hx on File[5]       PHYSICAL EXAMINATION:  There were no vitals filed for this visit.  CONSTITUTIONAL: No apparent distress, cooperative and communicative  NEUROLOGIC: Alert and oriented   HEAD: Normocephalic, atraumatic   EYES: Sclera non-icteric   ENT: Hearing intact, moist mucous membranes   NECK: No obvious goiter or masses   RESPIRATORY: Normal respiratory effort, Nonlabored breathing on room air  SKIN: No evident rashes   ABDOMEN: no obvious masses, no obvious distension      REVIEW OF SYSTEMS:    A comprehensive 10-point review of systems was completed.  Pertinent positives and negatives are noted in the the HPI.       LABORATORY DATA:   PSA PSA, Free PSA, % Free PSA Screen TOTAL PSA   Latest Ref Rng <=4.00 ng/mL ng/mL % <=4.00 ng/mL <=4.00 ng/mL   11/26/2016 2.4        4/28/2018 3.6  0.580  16      8/2/2018 3.8        10/31/2018 4.3 (H)  0.820  19      1/13/2019 4.61 (H)  0.480  --       3.9        3/14/2019 4.36 (H)  0.833  19      6/15/2019 7.80 (H)  1.480  19      8/2/2019 3.82  0.450  12      1/10/2020 5.01 (H)  0.651  13      3/14/2020 4.34 (H)  0.579  13      1/2/2021 4.81 (H)        6/4/2021    5.02 (H)     11/23/2021 5.80 (H)        1/29/2022 6.17 (H)        5/11/2022 5.74 (H)        11/12/2022 4.78 (H)        12/1/2022    5.41 (H)     12/22/2023    3.29     8/10/2024     3.39    12/13/2024    4.27 (H)     2/8/2025     3.58       Legend:  (H) High        IMAGING REVIEW:  Narrative   PROCEDURE: MRI PROSTATE(W+WO) (CPT=72197)     COMPARISON: [facility], MRI PROSTATE(W+WO) (CPT=72197), 4/14/2022, 10:23 AM.     INDICATIONS: C61 Prostate cancer (HCC)     PSA HISTORY:    02/08/2025:  3.58  12/13/2024:  4.27  08/10/2024:  3.39  12/22/2023:  3.29     Biopsy history:  09/10/2020:  Lianna  3+3=6 prostatic adenocarcinoma in the right region of interest and the left region of interest     TECHNIQUE: A complete multi-planar, multiparametric examination was performed at 3 Sri without use of an endorectal coil to optimize visualization of suspected pathology. Images were obtained both before and after administration of intravenous  gadolinium-based contrast agent.       FINDINGS:  PROSTATE:  SIZE: The prostate gland measures 4.2 x 4.0 x 4.8 cm, for a calculated volume of 38 cc.  PSA DENSITY: 0.094 ng/mL2       PERIPHERAL ZONE: The peripheral zone is predominantly T2 hyperintense.     Lesion 1:  Location: Left anterolateral peripheral zone near the apex  Size: 1.1 x 0.4 cm series 5, image 19  T2 Appearance: Moderately hypointense (3)  DWI/ADC Appearance: Marked restriction of diffusion in an area less than 1.5 cm (4)  Contrast Enhancement: No focal contrast enhancement (-)  Extraprostatic Extension: Abuts the prostatic membrane  PI-RADS assessment category: 4     Lesion 2:  Location: Left posterolateral peripheral zone at the mid gland to apex  Size: 0.7 x 0.3 cm series 5, image 18, previously 0.9 x 0.6 cm  T2 Appearance: Heterogeneous intensity (3)  DWI/ADC Appearance: Mild ADC/DWI signal abnormality (3)  Contrast Enhancement: No focal contrast enhancement  (-)  Extraprostatic Extension: None  PI-RADS assessment category: 3    Previously noted lesion in the right posteromedial peripheral zone is no longer seen.       TRANSITION ZONE: Numerous well-circumscribed ovoid nodules are seen throughout the transition zone, compatible with benign prostatic hyperplasia. There are no ill-defined, T2 hypointense lesions.  CENTRAL ZONE: Unremarkable.     OTHER PELVIC FINDINGS:  SEMINAL VESICLES: Homogeneously T2 hyperintense signal with unremarkable morphology.    URINARY BLADDER: No visible calculus or focal wall thickening.    PELVIC NODES: No lymphadenopathy with short axis measurement greater than 0.8 cm of the  para-aortic, paracaval, common iliac, internal iliac, external iliac, obturator, pararectal, presecral, or common femoral chains.    BONES:   No significant bony lesion or fracture.  BODY WALL: Small fat containing left inguinal hernia.  OTHER:   No free fluid is seen in the pelvis.                    Impression   CONCLUSION:     1. There is a new lesion in the left anterolateral peripheral zone near the apex measuring 1.1 x 0.4 cm with classification of PI-RADS 4.     2. Stable are slightly decreased lesion in the left posteromedial peripheral zone from mid gland apex measuring 0.7 x 0.3 cm with classification of PI-RADS 3.     3. Previously noted lesion in the right posteromedial peripheral zone is no longer seen.     4. Normal PSA density of 0.094 ng/mL2.     5. No aggressive appearing osseous lesions.  No enlarged lymph nodes.     6. Additional chronic or incidental findings are described in the body of this report.     The lesions were segmented in [employer].          Prostate Imaging - Reporting and Data System version 2 (PI-RADS v2) Assessment Categories:     PI-RADS 1: Very low (clinically significant cancer is highly unlikely to be present)  PI-RADS 2: Low (clinically significant cancer is unlikely to be present)  PI-RADS 3: Intermediate (the presence of clinically significant cancer is equivocal)  PI-RADS 4: High (clinically significant cancer is likely to be present)  PI-RADS 5: Very high (clinically significant cancer is likely to be present)           Dictated by (CST): Deonte Xiong MD on 4/18/2025 at 2:09 PM      Finalized by (CST): Deonte Xiong MD on 4/18/2025 at 2:27 PM              OTHER RELEVANT DATA:   none     IMPRESSION: History of prostate cancer on active surveillance who has a normal PSA and a negative uronav biopsy most recently (7/2025).    We discussed that the active surveillance protocol involves PSA/JASON every 3 to 6 months for 1 year followed by every 6-12 month checks.  If his PSA  remains stable, can space out PSA checks.  We may need to consider repeat MRI or biopsy if any significant increase in PSA if his PSA increases rapidly or biopsy shows more aggressive cancer, would push patient towards treatment with either surgery versus radiation plus ADT.      PLAN:  PSA in April  Return to clinic after PSA    Thank you for referring this very pleasant patient to my clinic. If you have any questions or concerns, please do not hesitate to contact me.    Sincerely,  Lor Lubin MD    30 minutes were spent on this patient at this visit obtaining a history, reviewing medical records, developing a treatment plan, counseling and discussing treatment strategy with patient, coordination of care and documentation.     The  Century Cures Act makes medical notes available to patients in the interest of transparency.  However, please be advised that this is a medical document.  It is intended as a peer to peer communication.  It is written in medical language and may contain abbreviations or verbiage that are technical and unfamiliar.  It may appear blunt or direct.  Medical documents are intended to carry relevant information, facts as evident, and the clinical opinion of the practitioner.         [1]   Past Medical History:   Numbness and tingling    Osteoarthritis   [2]   Past Surgical History:  Procedure Laterality Date    Colonoscopy      Knee surgery Left 3-    left knee arthroscopy   [3] No Known Allergies  [4]   Family History  Problem Relation Age of Onset    Cancer Father         bladder    Diabetes Father     Diabetes Mother     Cancer Son         kidney tumor   [5]   Social History  Socioeconomic History    Marital status:    Tobacco Use    Smoking status: Former     Current packs/day: 0.00     Types: Cigarettes     Start date: 3/1/1977     Quit date: 3/1/1997     Years since quittin.4    Smokeless tobacco: Never   Substance and Sexual Activity    Alcohol use: Yes      Alcohol/week: 4.0 standard drinks of alcohol     Types: 4 Cans of beer per week    Drug use: No   Other Topics Concern    Caffeine Concern Yes     Comment: coffee 6 cups daily    Exercise No    Seat Belt No    Special Diet No    Stress Concern No    Weight Concern No

## 2025-08-08 ENCOUNTER — TELEPHONE (OUTPATIENT)
Dept: SURGERY | Facility: CLINIC | Age: 65
End: 2025-08-08

## 2025-08-08 RX ORDER — TAMSULOSIN HYDROCHLORIDE 0.4 MG/1
0.4 CAPSULE ORAL DAILY
Qty: 90 CAPSULE | Refills: 3 | Status: SHIPPED | OUTPATIENT
Start: 2025-08-08 | End: 2026-08-03

## 2025-08-16 ENCOUNTER — HOSPITAL ENCOUNTER (OUTPATIENT)
Age: 65
Discharge: HOME | End: 2025-08-16
Attending: EMERGENCY MEDICINE

## 2025-08-16 VITALS
OXYGEN SATURATION: 96 % | TEMPERATURE: 99 F | DIASTOLIC BLOOD PRESSURE: 65 MMHG | RESPIRATION RATE: 18 BRPM | HEART RATE: 75 BPM | SYSTOLIC BLOOD PRESSURE: 132 MMHG

## 2025-08-16 DIAGNOSIS — J02.0 STREPTOCOCCAL SORE THROAT: Primary | ICD-10-CM

## 2025-08-16 DIAGNOSIS — K13.79 UVULAR EDEMA: ICD-10-CM

## 2025-08-16 LAB
S PYO AG THROAT QL IA.RAPID: POSITIVE
SARS-COV-2 RNA RESP QL NAA+PROBE: NOT DETECTED

## 2025-08-16 PROCEDURE — 99203 OFFICE O/P NEW LOW 30 MIN: CPT

## 2025-08-16 PROCEDURE — 87651 STREP A DNA AMP PROBE: CPT | Performed by: EMERGENCY MEDICINE

## 2025-08-16 PROCEDURE — 99213 OFFICE O/P EST LOW 20 MIN: CPT

## 2025-08-16 RX ORDER — PREDNISONE 20 MG/1
40 TABLET ORAL DAILY
Qty: 10 TABLET | Refills: 0 | Status: SHIPPED | OUTPATIENT
Start: 2025-08-16 | End: 2025-08-21

## 2025-08-16 RX ORDER — AMOXICILLIN 875 MG/1
875 TABLET, COATED ORAL 2 TIMES DAILY
Qty: 20 TABLET | Refills: 0 | Status: SHIPPED | OUTPATIENT
Start: 2025-08-16 | End: 2025-08-26

## 2025-08-25 ENCOUNTER — LAB ENCOUNTER (OUTPATIENT)
Dept: LAB | Facility: HOSPITAL | Age: 65
End: 2025-08-25
Attending: UROLOGY

## 2025-08-25 DIAGNOSIS — I25.10 CORONARY ATHEROSCLEROSIS OF NATIVE CORONARY ARTERY: Primary | ICD-10-CM

## 2025-08-25 DIAGNOSIS — E78.5 HYPERLIPEMIA: ICD-10-CM

## 2025-08-25 LAB
CHOLEST SERPL-MCNC: 137 MG/DL (ref ?–200)
FASTING PATIENT LIPID ANSWER: YES
HDLC SERPL-MCNC: 60 MG/DL (ref 40–59)
LDLC SERPL CALC-MCNC: 60 MG/DL (ref ?–100)
NONHDLC SERPL-MCNC: 77 MG/DL (ref ?–130)
TRIGL SERPL-MCNC: 91 MG/DL (ref 30–149)
VLDLC SERPL CALC-MCNC: 13 MG/DL (ref 0–30)

## 2025-08-25 PROCEDURE — 80061 LIPID PANEL: CPT

## 2025-08-25 PROCEDURE — 36415 COLL VENOUS BLD VENIPUNCTURE: CPT

## (undated) DIAGNOSIS — C61 PROSTATE CANCER (HCC): Primary | ICD-10-CM

## (undated) DEVICE — OCCLUSIVE GAUZE STRIP,3% BISMUTH TRIBROMOPHENATE IN PETROLATUM BLEND: Brand: XEROFORM

## (undated) DEVICE — 60 ML SYRINGE LUER-LOCK TIP: Brand: MONOJECT

## (undated) DEVICE — NEEDLE HPO 18GA 1.5IN ECLPS

## (undated) DEVICE — PADDING 4YDX6IN CTTN STRL WBRL

## (undated) DEVICE — STERILE LATEX POWDER-FREE SURGICAL GLOVESWITH NITRILE COATING: Brand: PROTEXIS

## (undated) DEVICE — TUBING IRR 16FT CNT WV 3 ASCP

## (undated) DEVICE — SOLUTION SURG DURA PREP HAZMAT

## (undated) DEVICE — COTTON UNDERCAST PADDING,REGULAR FINISH: Brand: WEBRIL

## (undated) DEVICE — SUCTION CANISTER, 3000CC,SAFELINER: Brand: DEROYAL

## (undated) DEVICE — BANDAGE ROLL,100% COTTON, 6 PLY, LARGE: Brand: KERLIX

## (undated) DEVICE — ZIMMER® STERILE DISPOSABLE TOURNIQUET CUFF WITH PLC, DUAL PORT, SINGLE BLADDER, 30 IN. (76 CM)

## (undated) DEVICE — STERILE LATEX POWDER-FREE SURGICAL GLOVES WITH HYDROGEL COATING, SMOOTH FINISH, STRAIGHT FINGER: Brand: PROTEXIS

## (undated) DEVICE — 3 ML SYRINGE LUER-LOCK TIP: Brand: MONOJECT

## (undated) DEVICE — GAUZE SPONGES,12 PLY: Brand: CURITY

## (undated) DEVICE — DECANTER SPIKE TRANSFLOW

## (undated) DEVICE — ARTHROSCOPY: Brand: MEDLINE INDUSTRIES, INC.

## (undated) DEVICE — SOL  .9 1000ML BTL

## (undated) DEVICE — KENDALL SCD EXPRESS SLEEVES, KNEE LENGTH, MEDIUM: Brand: KENDALL SCD

## (undated) DEVICE — SOCK CNSTR 4IN TNPSL UNV SPEC

## (undated) DEVICE — 3M™ STERI-DRAPE™ U-DRAPE 1015: Brand: STERI-DRAPE™

## (undated) DEVICE — TRAY SRGPRP PVP IOD WT SCRB SM

## (undated) DEVICE — COVER SGL STRL LGHT HNDL BLU

## (undated) DEVICE — BLADE SHVR COOLCUT 13CM 4MM

## (undated) DEVICE — BANDAGE FLXMSTR 11YDX6IN STRL

## (undated) DEVICE — SOL  .9 3000ML

## (undated) DEVICE — SUTURE ETHILON 3-0 669H

## (undated) DEVICE — AMBIENT SUPER MULTIVAC 50 WITH                                    INTEGRATED FINGER SWITCHES IFS: Brand: COBLATION

## (undated) NOTE — MR AVS SNAPSHOT
[address]  [address] [facility] [phone]               Thank you for choosing us for your health care visit with Kim Barajas PT.   We are glad to serve you and happy to provide you with this summa [address] [facility] in [facility] [address] ([address])    150 [facility] [phone]           Please arrive at your scheduled appointment time. Wear comfortable, loose fitting clothing.             Apr 24, 2017  4:00 PM

## (undated) NOTE — MR AVS SNAPSHOT
[address]  [address] [place] [phone]               Thank you for choosing us for your health care visit with Jeremy Ellison PT.   We are glad to serve you and happy to provide you with this summary o [facility] SURGICAL AND CARDIOVASCULAR [facility] in [facility] [address] ([address] [facility])    150 [facility] [phone]           Please arrive at your scheduled appointment time. Wear comfortable, loose fitting clothing.             Apr 05, 2017  5:20 PM

## (undated) NOTE — MR AVS SNAPSHOT
[address]  150 [facility] [phone]               Thank you for choosing us for your health care visit with Kalye Eason PT.   We are glad to serve you and happy to provide you with this summa Please arrive at your scheduled appointment time. Wear comfortable, loose fitting clothing.             May 03, 2017  4:00 PM   [facility] Visit By Therapist with SA[facility] in [address] [place] [address]

## (undated) NOTE — LETTER
Janice Cardoso Md  [address] [facility] [facility] [address] [facility], [address]       01/09/19        Patient: Lexii Hopkins   YOB: 1960   Date of Visit: 1/9/2019       Dear  Dr. Kishore Hopkins MD,      Thank you for referring Lexii Hopkins to my pract to before. He feels this is a mild problem.  I explained to patient recommended measures to help prevent nocturia- please see instructions below for a summary of the discussion that took place.         RECOMMENDATIONS AND TREATMENT PLAN       1.  Blood draw

## (undated) NOTE — LETTER
[facility], [place]  Authorization for Surgical Operation or Procedure    1.  I hereby authorize Dr. Chris Odonnell , my physician and the assistant, to perform the following operation and/or procedure:  ULTRASOUND GUIDED PROSTATE BI performed for the purposes of advancing medicine, science, and/or education, provided my identity is not revealed. If the procedure has been videotaped, the physician/surgeon will obtain the original videotape.  The hospital will not be responsible for stor alternative to the proposed treatment. I have also explained the risks and benefits involved in the refusal of the proposed treatment and have answered the patient's questions.  If I have a significant financial interest in a co-management agreement or a si

## (undated) NOTE — MR AVS SNAPSHOT
[address]  [address] [place] [phone]               Thank you for choosing us for your health care visit with Jasmin Mandel PT.   We are glad to serve you and happy to provide you with this summary o Please arrive at your scheduled appointment time. Wear comfortable, loose fitting clothing.             Mar 30, 2017  3:15 PM   [facility] Visit By Therapist with Oliverio Gutierrez PT   [facility] in [address] [place], [place] [id number] [facility]

## (undated) NOTE — LETTER
[address] [place] [address]      Authorization for Surgical Operation and Procedure     Date:_____[id number]______                                                                                                         Time: potential risks that can occur: fever and allergic reactions, hemolytic reactions, transmission of diseases such as Hepatitis, AIDS and Cytomegalovirus (CMV) and fluid overload.   In the event that I wish to have an autologous transfusion of my own blood, o attending physician will determine when the applicable recovery period ends for purposes of reinstating the DNAR order.   10. Patients having a sterilization procedure: I understand that if the procedure is successful the results will be permanent and it wi _______________________________________________________________ _____________________________  Jeanmarie Stairs of Physician)                                                                                         (Date)                                   (Time

## (undated) NOTE — MR AVS SNAPSHOT
[address]  [address] [place] [phone]               Thank you for choosing us for your health care visit with Mega Mcdonald PT.   We are glad to serve you and happy to provide you with this summary o [address] [facility] in [facility] [address] ([address])    150 [facility] [phone]           Please arrive at your scheduled appointment time. Wear comfortable, loose fitting clothing.             Apr 05, 2017  4:00 PM

## (undated) NOTE — Clinical Note
PVK,This is a patient of yours that you were following re: elevated PSA. He had MRI prostate at [facility] showing PI-RADS 4. I have asked Rain Guido to schedule him for uronav fusion biopsy. Do you agree? Thank you,Annelise Crews [facility]

## (undated) NOTE — MR AVS SNAPSHOT
[address]  [address] [facility] [phone]               Thank you for choosing us for your health care visit with Taras Booker PTA.   We are glad to serve you and happy to provide you with this summ Please arrive at your scheduled appointment time. Wear comfortable, loose fitting clothing.             Apr 13, 2017  4:00 PM   [facility] Visit By Therapist with MURTAZA [facility] in [address] [place] [address]

## (undated) NOTE — Clinical Note
Patient Name: Edwardo Dover  YOB: 1960          MRN number:  [id number]  Date:  3/20/2017  Referring Physician: Radha Monsalve    PHYSICAL THERAPY EVALUATION:    Referring Physician: Dr. Stewart Player: Internal derangement of knee, ac heal; club, running x2 days a week x5 miles    Occupation:   Occupational Activities: sitting, standing, walking, lifting and pushing/pulling  Equipment Currently Using: none    Past medical history was reviewed with Bettyjane Dakin.  Significant findings Rehab Potential:good    FOTO: 40/100      Patient/Family/Caregiver pt was advised of these findings, precautions, and treatment options and has agreed to actively participate in planning and for this course of care.     Thank you for your referral. Please c

## (undated) NOTE — IP AVS SNAPSHOT
[facility]    [address], [place] ~ [phone]                Discharge Summary   3/10/2017    Mr. Anton James           Admission Information        Provider Department    3/10/2017 Corina Boyd MD [facility] · For you to have a sore throat if you had a breathing tube during surgery (while you were asleep!). The sore throat should get better within 48 hours.  You can gargle with warm salt water (1/2 tsp in 4 oz warm water) or use a throat lozenge for comfort  · If you receive a NSQIP questionnaire, and have questions please contact:   Alberto Degroot RN, MA, [place]  [phone]      If you have a problem when you are at home:    · Call your surgeons offic 141 (03/05/17)  4.1 (03/05/17)  108      Radiology Exams     None         Additional Information       We are concerned for your overall well being:    - If you are a smoker or have smoked in the last 12 months, we encourage you to explore options for quit

## (undated) NOTE — MR AVS SNAPSHOT
[address]  [address] [place] [phone]               Thank you for choosing us for your health care visit with Sharif Jones PT.   We are glad to serve you and happy to provide you with this summary o Please arrive at your scheduled appointment time. Wear comfortable, loose fitting clothing.             Mar 28, 2017  4:00 PM   [facility] Visit By Therapist with Sho Christian PT   [facility] in [address] [place], [place] [id number] [facility]